# Patient Record
Sex: FEMALE | Race: WHITE | NOT HISPANIC OR LATINO | URBAN - METROPOLITAN AREA
[De-identification: names, ages, dates, MRNs, and addresses within clinical notes are randomized per-mention and may not be internally consistent; named-entity substitution may affect disease eponyms.]

---

## 2021-01-01 ENCOUNTER — EXTERNAL RECORD (OUTPATIENT)
Dept: OTHER | Age: 61
End: 2021-01-01

## 2021-07-20 ENCOUNTER — EXTERNAL RECORD (OUTPATIENT)
Dept: HEALTH INFORMATION MANAGEMENT | Facility: OTHER | Age: 61
End: 2021-07-20

## 2021-07-21 ENCOUNTER — OFFICE VISIT (OUTPATIENT)
Dept: INTERNAL MEDICINE | Age: 61
End: 2021-07-21

## 2021-07-21 ENCOUNTER — EXTERNAL RECORD (OUTPATIENT)
Dept: HEALTH INFORMATION MANAGEMENT | Facility: OTHER | Age: 61
End: 2021-07-21

## 2021-07-21 ENCOUNTER — NURSE TRIAGE (OUTPATIENT)
Dept: INTERNAL MEDICINE | Age: 61
End: 2021-07-21

## 2021-07-21 VITALS
OXYGEN SATURATION: 99 % | WEIGHT: 130.2 LBS | BODY MASS INDEX: 24.58 KG/M2 | SYSTOLIC BLOOD PRESSURE: 99 MMHG | HEART RATE: 86 BPM | HEIGHT: 61 IN | RESPIRATION RATE: 18 BRPM | TEMPERATURE: 97.6 F | DIASTOLIC BLOOD PRESSURE: 61 MMHG

## 2021-07-21 DIAGNOSIS — M79.641 RIGHT HAND PAIN: Primary | ICD-10-CM

## 2021-07-21 DIAGNOSIS — F31.64 SEVERE BIPOLAR I DISORDER, MOST RECENT EPISODE MIXED, WITH PSYCHOTIC FEATURES (CMD): ICD-10-CM

## 2021-07-21 DIAGNOSIS — R20.2 NUMBNESS AND TINGLING IN RIGHT HAND: ICD-10-CM

## 2021-07-21 DIAGNOSIS — R20.0 NUMBNESS AND TINGLING IN RIGHT HAND: ICD-10-CM

## 2021-07-21 PROBLEM — K52.9 NONSPECIFIC COLITIS: Status: ACTIVE | Noted: 2021-07-21

## 2021-07-21 PROBLEM — F31.9 BIPOLAR I DISORDER (CMD): Status: ACTIVE | Noted: 2021-07-21

## 2021-07-21 PROBLEM — M76.50 PATELLAR TENDINITIS: Status: ACTIVE | Noted: 2020-06-16

## 2021-07-21 PROBLEM — S62.304A FRACTURE OF FOURTH METACARPAL BONE OF RIGHT HAND: Status: ACTIVE | Noted: 2021-07-21

## 2021-07-21 PROCEDURE — 99203 OFFICE O/P NEW LOW 30 MIN: CPT | Performed by: FAMILY MEDICINE

## 2021-07-21 RX ORDER — MELOXICAM 7.5 MG/1
TABLET ORAL
COMMUNITY
Start: 2020-05-11 | End: 2021-07-23 | Stop reason: ALTCHOICE

## 2021-07-21 RX ORDER — GABAPENTIN 100 MG/1
100 CAPSULE ORAL 3 TIMES DAILY
Qty: 90 CAPSULE | Refills: 2 | Status: ON HOLD | OUTPATIENT
Start: 2021-07-21 | End: 2021-08-10 | Stop reason: HOSPADM

## 2021-07-21 RX ORDER — AMOXICILLIN AND CLAVULANATE POTASSIUM 875; 125 MG/1; MG/1
TABLET, FILM COATED ORAL
COMMUNITY
Start: 2017-08-08 | End: 2021-07-22 | Stop reason: ALTCHOICE

## 2021-07-21 ASSESSMENT — PATIENT HEALTH QUESTIONNAIRE - PHQ9
SUM OF ALL RESPONSES TO PHQ9 QUESTIONS 1 AND 2: 0
SUM OF ALL RESPONSES TO PHQ9 QUESTIONS 1 AND 2: 0
CLINICAL INTERPRETATION OF PHQ9 SCORE: NO FURTHER SCREENING NEEDED
2. FEELING DOWN, DEPRESSED OR HOPELESS: NOT AT ALL
CLINICAL INTERPRETATION OF PHQ2 SCORE: NO FURTHER SCREENING NEEDED
1. LITTLE INTEREST OR PLEASURE IN DOING THINGS: NOT AT ALL

## 2021-07-22 ENCOUNTER — WALK IN (OUTPATIENT)
Dept: URGENT CARE | Age: 61
End: 2021-07-22

## 2021-07-22 VITALS
TEMPERATURE: 97.7 F | DIASTOLIC BLOOD PRESSURE: 60 MMHG | RESPIRATION RATE: 18 BRPM | SYSTOLIC BLOOD PRESSURE: 98 MMHG | HEART RATE: 90 BPM | OXYGEN SATURATION: 99 %

## 2021-07-22 DIAGNOSIS — M79.641 RIGHT HAND PAIN: ICD-10-CM

## 2021-07-22 DIAGNOSIS — R20.2 NUMBNESS AND TINGLING IN RIGHT HAND: ICD-10-CM

## 2021-07-22 DIAGNOSIS — J02.9 SORE THROAT: Primary | ICD-10-CM

## 2021-07-22 DIAGNOSIS — Z20.822 SUSPECTED COVID-19 VIRUS INFECTION: ICD-10-CM

## 2021-07-22 DIAGNOSIS — R20.0 NUMBNESS AND TINGLING IN RIGHT HAND: ICD-10-CM

## 2021-07-22 LAB
INTERNAL PROCEDURAL CONTROLS ACCEPTABLE: YES
S PYO AG THROAT QL IA.RAPID: NEGATIVE
SARS-COV+SARS-COV-2 AG RESP QL IA.RAPID: NOT DETECTED

## 2021-07-22 PROCEDURE — 87880 STREP A ASSAY W/OPTIC: CPT | Performed by: INTERNAL MEDICINE

## 2021-07-22 PROCEDURE — 87426 SARSCOV CORONAVIRUS AG IA: CPT | Performed by: INTERNAL MEDICINE

## 2021-07-22 PROCEDURE — 99214 OFFICE O/P EST MOD 30 MIN: CPT | Performed by: INTERNAL MEDICINE

## 2021-07-22 RX ORDER — MELOXICAM 7.5 MG/1
7.5 TABLET ORAL DAILY
Qty: 30 TABLET | Refills: 0 | Status: SHIPPED | OUTPATIENT
Start: 2021-07-22 | End: 2021-09-22 | Stop reason: ALTCHOICE

## 2021-07-22 RX ORDER — CODEINE PHOSPHATE AND GUAIFENESIN 10; 100 MG/5ML; MG/5ML
5 SOLUTION ORAL 3 TIMES DAILY PRN
Qty: 120 ML | Refills: 0 | Status: ON HOLD | OUTPATIENT
Start: 2021-07-22 | End: 2021-08-10 | Stop reason: DRUGHIGH

## 2021-07-23 ENCOUNTER — IMAGING SERVICES (OUTPATIENT)
Dept: GENERAL RADIOLOGY | Age: 61
End: 2021-07-23
Attending: INTERNAL MEDICINE

## 2021-07-23 ENCOUNTER — OFFICE VISIT (OUTPATIENT)
Dept: SPORTS MEDICINE | Age: 61
End: 2021-07-23
Attending: FAMILY MEDICINE

## 2021-07-23 VITALS
SYSTOLIC BLOOD PRESSURE: 128 MMHG | WEIGHT: 130 LBS | BODY MASS INDEX: 24.55 KG/M2 | HEART RATE: 80 BPM | DIASTOLIC BLOOD PRESSURE: 72 MMHG | HEIGHT: 61 IN

## 2021-07-23 DIAGNOSIS — R20.0 NUMBNESS AND TINGLING IN RIGHT HAND: Primary | ICD-10-CM

## 2021-07-23 DIAGNOSIS — M19.041 OSTEOARTHRITIS OF RIGHT HAND, UNSPECIFIED OSTEOARTHRITIS TYPE: ICD-10-CM

## 2021-07-23 DIAGNOSIS — M79.641 RIGHT HAND PAIN: ICD-10-CM

## 2021-07-23 DIAGNOSIS — R20.2 NUMBNESS AND TINGLING IN RIGHT HAND: Primary | ICD-10-CM

## 2021-07-23 DIAGNOSIS — M77.8 TENDINITIS OF RIGHT WRIST: ICD-10-CM

## 2021-07-23 PROCEDURE — 99244 OFF/OP CNSLTJ NEW/EST MOD 40: CPT | Performed by: INTERNAL MEDICINE

## 2021-07-23 PROCEDURE — 73130 X-RAY EXAM OF HAND: CPT | Performed by: RADIOLOGY

## 2021-07-23 PROCEDURE — L3908 WHO COCK-UP NONMOLDE PRE OTS: HCPCS

## 2021-07-23 RX ORDER — PREDNISONE 10 MG/1
TABLET ORAL
COMMUNITY
Start: 2021-07-21 | End: 2021-07-23 | Stop reason: ALTCHOICE

## 2021-07-26 ENCOUNTER — APPOINTMENT (OUTPATIENT)
Dept: INTERNAL MEDICINE | Age: 61
End: 2021-07-26

## 2021-07-27 ENCOUNTER — APPOINTMENT (OUTPATIENT)
Dept: INTERNAL MEDICINE | Age: 61
End: 2021-07-27

## 2021-07-29 LAB
SARS-COV-2 RNA SPEC QL NAA+PROBE: NOT DETECTED
SPECIMEN SOURCE: NORMAL

## 2021-07-30 ENCOUNTER — HOSPITAL ENCOUNTER (INPATIENT)
Age: 61
LOS: 11 days | Discharge: HOME | DRG: 885 | End: 2021-08-10
Attending: PSYCHIATRY & NEUROLOGY | Admitting: PSYCHIATRY & NEUROLOGY

## 2021-07-30 LAB
CHOLEST SERPL-MCNC: 180 MG/DL
CHOLEST/HDLC SERPL: 3.8 {RATIO}
FASTING DURATION TIME PATIENT: ABNORMAL H
HBA1C MFR BLD: 6.1 % (ref 4.5–5.6)
HDLC SERPL-MCNC: 47 MG/DL
LDLC SERPL CALC-MCNC: 108 MG/DL
NONHDLC SERPL-MCNC: 133 MG/DL
TRIGL SERPL-MCNC: 125 MG/DL
TSH SERPL-ACNC: 2.44 MCUNITS/ML (ref 0.35–5)

## 2021-07-30 PROCEDURE — 80061 LIPID PANEL: CPT | Performed by: PSYCHIATRY & NEUROLOGY

## 2021-07-30 PROCEDURE — 99253 IP/OBS CNSLTJ NEW/EST LOW 45: CPT | Performed by: INTERNAL MEDICINE

## 2021-07-30 PROCEDURE — 36415 COLL VENOUS BLD VENIPUNCTURE: CPT | Performed by: PSYCHIATRY & NEUROLOGY

## 2021-07-30 PROCEDURE — 99222 1ST HOSP IP/OBS MODERATE 55: CPT | Performed by: PSYCHIATRY & NEUROLOGY

## 2021-07-30 PROCEDURE — 10004577 HB ROOM CHARGE PSYCH

## 2021-07-30 PROCEDURE — 84443 ASSAY THYROID STIM HORMONE: CPT | Performed by: PSYCHIATRY & NEUROLOGY

## 2021-07-30 PROCEDURE — 10004651 HB RX, NO CHARGE ITEM: Performed by: PSYCHIATRY & NEUROLOGY

## 2021-07-30 PROCEDURE — 83036 HEMOGLOBIN GLYCOSYLATED A1C: CPT | Performed by: PSYCHIATRY & NEUROLOGY

## 2021-07-30 RX ORDER — HYDROXYZINE HYDROCHLORIDE 25 MG/1
50 TABLET, FILM COATED ORAL EVERY 4 HOURS PRN
Status: DISCONTINUED | OUTPATIENT
Start: 2021-07-30 | End: 2021-08-10 | Stop reason: HOSPADM

## 2021-07-30 RX ORDER — MAGNESIUM HYDROXIDE/ALUMINUM HYDROXICE/SIMETHICONE 120; 1200; 1200 MG/30ML; MG/30ML; MG/30ML
30 SUSPENSION ORAL EVERY 4 HOURS PRN
Status: DISCONTINUED | OUTPATIENT
Start: 2021-07-30 | End: 2021-08-10 | Stop reason: HOSPADM

## 2021-07-30 RX ORDER — TRAZODONE HYDROCHLORIDE 50 MG/1
50 TABLET ORAL NIGHTLY PRN
Status: DISCONTINUED | OUTPATIENT
Start: 2021-07-30 | End: 2021-08-10 | Stop reason: HOSPADM

## 2021-07-30 RX ORDER — BENZTROPINE MESYLATE 1 MG/1
1 TABLET ORAL EVERY 4 HOURS PRN
Status: DISCONTINUED | OUTPATIENT
Start: 2021-07-30 | End: 2021-08-10 | Stop reason: HOSPADM

## 2021-07-30 RX ORDER — LORAZEPAM 2 MG/1
2 TABLET ORAL EVERY 4 HOURS PRN
Status: DISCONTINUED | OUTPATIENT
Start: 2021-07-30 | End: 2021-08-10 | Stop reason: HOSPADM

## 2021-07-30 RX ORDER — HALOPERIDOL 5 MG/1
5 TABLET ORAL EVERY 4 HOURS PRN
Status: DISCONTINUED | OUTPATIENT
Start: 2021-07-30 | End: 2021-08-10 | Stop reason: HOSPADM

## 2021-07-30 RX ORDER — HALOPERIDOL 5 MG/ML
5 INJECTION INTRAMUSCULAR EVERY 4 HOURS PRN
Status: DISCONTINUED | OUTPATIENT
Start: 2021-07-30 | End: 2021-08-10 | Stop reason: HOSPADM

## 2021-07-30 RX ORDER — BENZTROPINE MESYLATE 1 MG/ML
1 INJECTION INTRAMUSCULAR; INTRAVENOUS EVERY 4 HOURS PRN
Status: DISCONTINUED | OUTPATIENT
Start: 2021-07-30 | End: 2021-08-10 | Stop reason: HOSPADM

## 2021-07-30 RX ORDER — GABAPENTIN 300 MG/1
300 CAPSULE ORAL EVERY 12 HOURS SCHEDULED
Status: DISCONTINUED | OUTPATIENT
Start: 2021-07-31 | End: 2021-08-04

## 2021-07-30 RX ORDER — ACETAMINOPHEN 325 MG/1
650 TABLET ORAL EVERY 4 HOURS PRN
Status: DISCONTINUED | OUTPATIENT
Start: 2021-07-30 | End: 2021-08-10 | Stop reason: HOSPADM

## 2021-07-30 RX ORDER — LORAZEPAM 2 MG/ML
2 INJECTION INTRAMUSCULAR EVERY 4 HOURS PRN
Status: DISCONTINUED | OUTPATIENT
Start: 2021-07-30 | End: 2021-08-10 | Stop reason: HOSPADM

## 2021-07-30 RX ADMIN — ACETAMINOPHEN 650 MG: 325 TABLET, FILM COATED ORAL at 13:58

## 2021-07-30 ASSESSMENT — LIFESTYLE VARIABLES
ARE YOU BLIND OR DO YOU HAVE SERIOUS DIFFICULTY SEEING, EVEN WHEN WEARING GLASSES: NO
AUDIT-C TOTAL SCORE: 0
HOW OFTEN DO YOU HAVE 6 OR MORE DRINKS ON ONE OCCASION: NEVER
RECENTLY LOST WEIGHT WITHOUT TRYING: 0
CAFFEINE: YES
SHORT OF BREATH OR FATIGUE WITH ADLS: NO
IS PATIENT ABLE TO COMPLETE ASSESSMENT AT THIS TIME: YES
AMOUNT_OF_TOBACCO_USED: FIVE OR MORE CIGARETTES PER DAY AND/OR CIGAR OR PIPE DAILY
TYPE_OF_TOBACCO_USED: CIGARETTES
ADL NEEDS ASSIST: NO
ARE YOU DEAF OR DO YOU HAVE SERIOUS DIFFICULTY  HEARING: NO
ALCOHOL_USE_STATUS: NO OR LOW RISK WITH VALIDATED TOOL
HOW OFTEN DO YOU HAVE A DRINK CONTAINING ALCOHOL: NEVER
RECENT DECLINE IN ADLS: NO
HOW MANY STANDARD DRINKS CONTAINING ALCOHOL DO YOU HAVE ON A TYPICAL DAY: 0,1 OR 2
HAVE YOU BEEN EATING POORLY BECAUSE OF A DECREASED APPETITE: 1
ADL BEFORE ADMISSION: INDEPENDENT
TOBACCO_USE_STATUS_IN_THE_LAST_30_DAYS: USED TOBACCO IN THE LAST 30 DAYS

## 2021-07-30 ASSESSMENT — PAIN SCALES - GENERAL
PAINLEVEL_OUTOF10: 0

## 2021-07-30 ASSESSMENT — ACTIVITIES OF DAILY LIVING (ADL): ADL_SCORE: 12

## 2021-07-31 PROCEDURE — 10002803 HB RX 637: Performed by: PSYCHIATRY & NEUROLOGY

## 2021-07-31 PROCEDURE — 10004577 HB ROOM CHARGE PSYCH

## 2021-07-31 PROCEDURE — 10004651 HB RX, NO CHARGE ITEM: Performed by: PSYCHIATRY & NEUROLOGY

## 2021-07-31 RX ADMIN — ACETAMINOPHEN 650 MG: 325 TABLET, FILM COATED ORAL at 15:50

## 2021-07-31 RX ADMIN — GABAPENTIN 300 MG: 300 CAPSULE ORAL at 21:16

## 2021-07-31 RX ADMIN — GABAPENTIN 300 MG: 300 CAPSULE ORAL at 08:26

## 2021-07-31 RX ADMIN — TRAZODONE HYDROCHLORIDE 50 MG: 50 TABLET ORAL at 21:16

## 2021-07-31 ASSESSMENT — PAIN SCALES - GENERAL
PAINLEVEL_OUTOF10: 0
PAINLEVEL_OUTOF10: 3

## 2021-08-01 PROCEDURE — 10004577 HB ROOM CHARGE PSYCH

## 2021-08-01 PROCEDURE — 10002803 HB RX 637: Performed by: PSYCHIATRY & NEUROLOGY

## 2021-08-01 RX ADMIN — ALUMINUM HYDROXIDE, MAGNESIUM HYDROXIDE, AND SIMETHICONE 30 ML: 200; 200; 20 SUSPENSION ORAL at 20:19

## 2021-08-01 RX ADMIN — GABAPENTIN 300 MG: 300 CAPSULE ORAL at 07:59

## 2021-08-01 RX ADMIN — ALUMINUM HYDROXIDE, MAGNESIUM HYDROXIDE, AND SIMETHICONE 30 ML: 200; 200; 20 SUSPENSION ORAL at 13:31

## 2021-08-01 RX ADMIN — TRAZODONE HYDROCHLORIDE 50 MG: 50 TABLET ORAL at 20:55

## 2021-08-01 RX ADMIN — GABAPENTIN 300 MG: 300 CAPSULE ORAL at 20:55

## 2021-08-01 ASSESSMENT — PAIN SCALES - GENERAL
PAINLEVEL_OUTOF10: 0

## 2021-08-02 PROCEDURE — 99232 SBSQ HOSP IP/OBS MODERATE 35: CPT | Performed by: PSYCHIATRY & NEUROLOGY

## 2021-08-02 PROCEDURE — 10002803 HB RX 637: Performed by: PSYCHIATRY & NEUROLOGY

## 2021-08-02 PROCEDURE — 10002803 HB RX 637: Performed by: INTERNAL MEDICINE

## 2021-08-02 PROCEDURE — 10004577 HB ROOM CHARGE PSYCH

## 2021-08-02 PROCEDURE — 10004651 HB RX, NO CHARGE ITEM: Performed by: PSYCHIATRY & NEUROLOGY

## 2021-08-02 RX ORDER — MELOXICAM 7.5 MG/1
7.5 TABLET ORAL DAILY
Status: DISCONTINUED | OUTPATIENT
Start: 2021-08-02 | End: 2021-08-10 | Stop reason: HOSPADM

## 2021-08-02 RX ADMIN — GABAPENTIN 300 MG: 300 CAPSULE ORAL at 08:50

## 2021-08-02 RX ADMIN — ACETAMINOPHEN 650 MG: 325 TABLET, FILM COATED ORAL at 11:20

## 2021-08-02 RX ADMIN — MELOXICAM 7.5 MG: 7.5 TABLET ORAL at 12:41

## 2021-08-02 RX ADMIN — GABAPENTIN 300 MG: 300 CAPSULE ORAL at 20:20

## 2021-08-02 ASSESSMENT — PAIN SCALES - GENERAL
PAINLEVEL_OUTOF10: 0
PAINLEVEL_OUTOF10: 0
PAINLEVEL_OUTOF10: 5

## 2021-08-03 PROCEDURE — 99232 SBSQ HOSP IP/OBS MODERATE 35: CPT | Performed by: PSYCHIATRY & NEUROLOGY

## 2021-08-03 PROCEDURE — 10002803 HB RX 637: Performed by: PSYCHIATRY & NEUROLOGY

## 2021-08-03 PROCEDURE — 10004577 HB ROOM CHARGE PSYCH

## 2021-08-03 PROCEDURE — 10002803 HB RX 637: Performed by: INTERNAL MEDICINE

## 2021-08-03 RX ADMIN — GABAPENTIN 300 MG: 300 CAPSULE ORAL at 08:12

## 2021-08-03 RX ADMIN — GABAPENTIN 300 MG: 300 CAPSULE ORAL at 21:19

## 2021-08-03 RX ADMIN — MELOXICAM 7.5 MG: 7.5 TABLET ORAL at 08:12

## 2021-08-03 ASSESSMENT — PAIN SCALES - GENERAL
PAINLEVEL_OUTOF10: 0
PAINLEVEL_OUTOF10: 0

## 2021-08-04 PROCEDURE — 10002803 HB RX 637: Performed by: PSYCHIATRY & NEUROLOGY

## 2021-08-04 PROCEDURE — 10002803 HB RX 637: Performed by: INTERNAL MEDICINE

## 2021-08-04 PROCEDURE — 10004577 HB ROOM CHARGE PSYCH

## 2021-08-04 PROCEDURE — 10004651 HB RX, NO CHARGE ITEM: Performed by: PSYCHIATRY & NEUROLOGY

## 2021-08-04 PROCEDURE — 99232 SBSQ HOSP IP/OBS MODERATE 35: CPT | Performed by: PSYCHIATRY & NEUROLOGY

## 2021-08-04 RX ORDER — GABAPENTIN 100 MG/1
200 CAPSULE ORAL 2 TIMES DAILY
Status: DISCONTINUED | OUTPATIENT
Start: 2021-08-04 | End: 2021-08-10 | Stop reason: HOSPADM

## 2021-08-04 RX ADMIN — ALUMINUM HYDROXIDE, MAGNESIUM HYDROXIDE, AND SIMETHICONE 30 ML: 200; 200; 20 SUSPENSION ORAL at 16:11

## 2021-08-04 RX ADMIN — TRAZODONE HYDROCHLORIDE 50 MG: 50 TABLET ORAL at 21:16

## 2021-08-04 RX ADMIN — GABAPENTIN 200 MG: 100 CAPSULE ORAL at 20:48

## 2021-08-04 RX ADMIN — ACETAMINOPHEN 650 MG: 325 TABLET, FILM COATED ORAL at 21:16

## 2021-08-04 RX ADMIN — GABAPENTIN 300 MG: 300 CAPSULE ORAL at 08:46

## 2021-08-04 RX ADMIN — MELOXICAM 7.5 MG: 7.5 TABLET ORAL at 08:46

## 2021-08-04 ASSESSMENT — PAIN SCALES - GENERAL
PAINLEVEL_OUTOF10: 0
PAINLEVEL_OUTOF10: 0
PAINLEVEL_OUTOF10: 7

## 2021-08-05 PROCEDURE — 10004651 HB RX, NO CHARGE ITEM: Performed by: PSYCHIATRY & NEUROLOGY

## 2021-08-05 PROCEDURE — 10004577 HB ROOM CHARGE PSYCH

## 2021-08-05 PROCEDURE — 10002803 HB RX 637: Performed by: INTERNAL MEDICINE

## 2021-08-05 PROCEDURE — 10002803 HB RX 637: Performed by: PSYCHIATRY & NEUROLOGY

## 2021-08-05 RX ADMIN — GABAPENTIN 200 MG: 100 CAPSULE ORAL at 20:47

## 2021-08-05 RX ADMIN — MELOXICAM 7.5 MG: 7.5 TABLET ORAL at 08:23

## 2021-08-05 RX ADMIN — GABAPENTIN 200 MG: 100 CAPSULE ORAL at 12:44

## 2021-08-05 RX ADMIN — ACETAMINOPHEN 650 MG: 325 TABLET, FILM COATED ORAL at 06:48

## 2021-08-05 RX ADMIN — TRAZODONE HYDROCHLORIDE 50 MG: 50 TABLET ORAL at 20:48

## 2021-08-05 ASSESSMENT — PAIN SCALES - GENERAL
PAINLEVEL_OUTOF10: 0
PAINLEVEL_OUTOF10: 0
PAINLEVEL_OUTOF10: 5
PAINLEVEL_OUTOF10: 0

## 2021-08-06 PROCEDURE — 10002803 HB RX 637: Performed by: INTERNAL MEDICINE

## 2021-08-06 PROCEDURE — 10004577 HB ROOM CHARGE PSYCH

## 2021-08-06 PROCEDURE — 10002803 HB RX 637: Performed by: PSYCHIATRY & NEUROLOGY

## 2021-08-06 PROCEDURE — 10004651 HB RX, NO CHARGE ITEM: Performed by: PSYCHIATRY & NEUROLOGY

## 2021-08-06 PROCEDURE — 99233 SBSQ HOSP IP/OBS HIGH 50: CPT | Performed by: PSYCHIATRY & NEUROLOGY

## 2021-08-06 RX ORDER — LANOLIN ALCOHOL/MO/W.PET/CERES
3 CREAM (GRAM) TOPICAL NIGHTLY
Status: DISCONTINUED | OUTPATIENT
Start: 2021-08-06 | End: 2021-08-10 | Stop reason: HOSPADM

## 2021-08-06 RX ADMIN — MELATONIN TAB 3 MG 3 MG: 3 TAB at 20:45

## 2021-08-06 RX ADMIN — ACETAMINOPHEN 650 MG: 325 TABLET, FILM COATED ORAL at 22:30

## 2021-08-06 RX ADMIN — MELOXICAM 7.5 MG: 7.5 TABLET ORAL at 08:22

## 2021-08-06 RX ADMIN — ACETAMINOPHEN 650 MG: 325 TABLET, FILM COATED ORAL at 00:23

## 2021-08-06 RX ADMIN — GABAPENTIN 200 MG: 100 CAPSULE ORAL at 13:36

## 2021-08-06 RX ADMIN — GABAPENTIN 200 MG: 100 CAPSULE ORAL at 20:45

## 2021-08-06 ASSESSMENT — PAIN SCALES - WONG BAKER: WONGBAKER_NUMERICALRESPONSE: 9

## 2021-08-06 ASSESSMENT — PAIN SCALES - GENERAL
PAINLEVEL_OUTOF10: 0
PAINLEVEL_OUTOF10: 0
PAINLEVEL_OUTOF10: 10
PAINLEVEL_OUTOF10: 10

## 2021-08-07 PROCEDURE — 10002803 HB RX 637: Performed by: INTERNAL MEDICINE

## 2021-08-07 PROCEDURE — 10004577 HB ROOM CHARGE PSYCH

## 2021-08-07 PROCEDURE — 10004651 HB RX, NO CHARGE ITEM: Performed by: PSYCHIATRY & NEUROLOGY

## 2021-08-07 PROCEDURE — 10002803 HB RX 637: Performed by: PSYCHIATRY & NEUROLOGY

## 2021-08-07 RX ADMIN — MELATONIN TAB 3 MG 3 MG: 3 TAB at 21:26

## 2021-08-07 RX ADMIN — GABAPENTIN 200 MG: 100 CAPSULE ORAL at 13:20

## 2021-08-07 RX ADMIN — ACETAMINOPHEN 650 MG: 325 TABLET, FILM COATED ORAL at 14:10

## 2021-08-07 RX ADMIN — ACETAMINOPHEN 650 MG: 325 TABLET, FILM COATED ORAL at 21:35

## 2021-08-07 RX ADMIN — MELOXICAM 7.5 MG: 7.5 TABLET ORAL at 08:06

## 2021-08-07 RX ADMIN — GABAPENTIN 200 MG: 100 CAPSULE ORAL at 21:26

## 2021-08-07 ASSESSMENT — PAIN SCALES - GENERAL
PAINLEVEL_OUTOF10: 7
PAINLEVEL_OUTOF10: 0
PAINLEVEL_OUTOF10: 0

## 2021-08-08 PROCEDURE — 10002803 HB RX 637: Performed by: PSYCHIATRY & NEUROLOGY

## 2021-08-08 PROCEDURE — 10002803 HB RX 637: Performed by: INTERNAL MEDICINE

## 2021-08-08 PROCEDURE — 10004577 HB ROOM CHARGE PSYCH

## 2021-08-08 PROCEDURE — 10004651 HB RX, NO CHARGE ITEM: Performed by: PSYCHIATRY & NEUROLOGY

## 2021-08-08 RX ADMIN — GABAPENTIN 200 MG: 100 CAPSULE ORAL at 12:32

## 2021-08-08 RX ADMIN — MELOXICAM 7.5 MG: 7.5 TABLET ORAL at 08:11

## 2021-08-08 RX ADMIN — MELATONIN TAB 3 MG 3 MG: 3 TAB at 20:41

## 2021-08-08 RX ADMIN — GABAPENTIN 200 MG: 100 CAPSULE ORAL at 20:41

## 2021-08-08 RX ADMIN — ACETAMINOPHEN 650 MG: 325 TABLET, FILM COATED ORAL at 12:32

## 2021-08-08 RX ADMIN — ACETAMINOPHEN 650 MG: 325 TABLET, FILM COATED ORAL at 02:40

## 2021-08-08 RX ADMIN — ACETAMINOPHEN 650 MG: 325 TABLET, FILM COATED ORAL at 20:47

## 2021-08-08 ASSESSMENT — PAIN SCALES - GENERAL
PAINLEVEL_OUTOF10: 9
PAINLEVEL_OUTOF10: 5
PAINLEVEL_OUTOF10: 0
PAINLEVEL_OUTOF10: 8

## 2021-08-09 PROCEDURE — 10002803 HB RX 637: Performed by: PSYCHIATRY & NEUROLOGY

## 2021-08-09 PROCEDURE — 10002803 HB RX 637: Performed by: INTERNAL MEDICINE

## 2021-08-09 PROCEDURE — 10004651 HB RX, NO CHARGE ITEM: Performed by: PSYCHIATRY & NEUROLOGY

## 2021-08-09 PROCEDURE — 99232 SBSQ HOSP IP/OBS MODERATE 35: CPT | Performed by: PSYCHIATRY & NEUROLOGY

## 2021-08-09 PROCEDURE — 10004577 HB ROOM CHARGE PSYCH

## 2021-08-09 RX ADMIN — ALUMINUM HYDROXIDE, MAGNESIUM HYDROXIDE, AND SIMETHICONE 30 ML: 200; 200; 20 SUSPENSION ORAL at 10:45

## 2021-08-09 RX ADMIN — GABAPENTIN 200 MG: 100 CAPSULE ORAL at 13:50

## 2021-08-09 RX ADMIN — ACETAMINOPHEN 650 MG: 325 TABLET, FILM COATED ORAL at 16:01

## 2021-08-09 RX ADMIN — GABAPENTIN 200 MG: 100 CAPSULE ORAL at 21:22

## 2021-08-09 RX ADMIN — MELOXICAM 7.5 MG: 7.5 TABLET ORAL at 08:08

## 2021-08-09 RX ADMIN — ACETAMINOPHEN 650 MG: 325 TABLET, FILM COATED ORAL at 03:17

## 2021-08-09 RX ADMIN — ALUMINUM HYDROXIDE, MAGNESIUM HYDROXIDE, AND SIMETHICONE 30 ML: 200; 200; 20 SUSPENSION ORAL at 19:59

## 2021-08-09 RX ADMIN — ACETAMINOPHEN 650 MG: 325 TABLET, FILM COATED ORAL at 08:08

## 2021-08-09 ASSESSMENT — PAIN SCALES - GENERAL
PAINLEVEL_OUTOF10: 5
PAINLEVEL_OUTOF10: 5
PAINLEVEL_OUTOF10: 7

## 2021-08-10 VITALS
HEART RATE: 80 BPM | OXYGEN SATURATION: 98 % | BODY MASS INDEX: 23.93 KG/M2 | HEIGHT: 61 IN | TEMPERATURE: 97.3 F | DIASTOLIC BLOOD PRESSURE: 87 MMHG | SYSTOLIC BLOOD PRESSURE: 144 MMHG | RESPIRATION RATE: 18 BRPM | WEIGHT: 126.76 LBS

## 2021-08-10 PROCEDURE — 10002803 HB RX 637: Performed by: INTERNAL MEDICINE

## 2021-08-10 PROCEDURE — 99238 HOSP IP/OBS DSCHRG MGMT 30/<: CPT | Performed by: PSYCHIATRY & NEUROLOGY

## 2021-08-10 PROCEDURE — 10004651 HB RX, NO CHARGE ITEM: Performed by: PSYCHIATRY & NEUROLOGY

## 2021-08-10 RX ORDER — LANOLIN ALCOHOL/MO/W.PET/CERES
3 CREAM (GRAM) TOPICAL NIGHTLY
Qty: 30 TABLET | Refills: 0 | Status: SHIPPED | OUTPATIENT
Start: 2021-08-10 | End: 2021-09-22 | Stop reason: ALTCHOICE

## 2021-08-10 RX ORDER — GABAPENTIN 100 MG/1
200 CAPSULE ORAL 2 TIMES DAILY
Qty: 120 CAPSULE | Refills: 0 | Status: SHIPPED | OUTPATIENT
Start: 2021-08-10 | End: 2021-11-02 | Stop reason: SDUPTHER

## 2021-08-10 RX ADMIN — ACETAMINOPHEN 650 MG: 325 TABLET, FILM COATED ORAL at 06:00

## 2021-08-10 RX ADMIN — MELOXICAM 7.5 MG: 7.5 TABLET ORAL at 08:20

## 2021-08-10 ASSESSMENT — PAIN SCALES - GENERAL
PAINLEVEL_OUTOF10: 5
PAINLEVEL_OUTOF10: 0

## 2021-08-11 ENCOUNTER — TELEPHONE (OUTPATIENT)
Dept: BEHAVIORAL HEALTH | Age: 61
End: 2021-08-11

## 2021-08-11 ENCOUNTER — APPOINTMENT (OUTPATIENT)
Dept: BEHAVIORAL HEALTH | Age: 61
End: 2021-08-11
Attending: PSYCHIATRY & NEUROLOGY

## 2021-08-25 ENCOUNTER — CASE MANAGEMENT (OUTPATIENT)
Dept: CARE COORDINATION | Age: 61
End: 2021-08-25

## 2021-09-03 ENCOUNTER — TELEPHONIC VISIT (OUTPATIENT)
Dept: BEHAVIORAL HEALTH | Age: 61
End: 2021-09-03
Attending: PSYCHIATRY & NEUROLOGY

## 2021-09-03 DIAGNOSIS — F31.64 SEVERE BIPOLAR I DISORDER, MOST RECENT EPISODE MIXED, WITH PSYCHOTIC FEATURES (CMD): Primary | ICD-10-CM

## 2021-09-03 PROCEDURE — 90791 PSYCH DIAGNOSTIC EVALUATION: CPT | Performed by: SOCIAL WORKER

## 2021-09-03 ASSESSMENT — PATIENT HEALTH QUESTIONNAIRE - PHQ9
SUM OF ALL RESPONSES TO PHQ9 QUESTIONS 1 AND 2: 2
1. LITTLE INTEREST OR PLEASURE IN DOING THINGS: SEVERAL DAYS
SUM OF ALL RESPONSES TO PHQ9 QUESTIONS 1 AND 2: 2
CLINICAL INTERPRETATION OF PHQ2 SCORE: NO FURTHER SCREENING NEEDED
2. FEELING DOWN, DEPRESSED OR HOPELESS: SEVERAL DAYS
CLINICAL INTERPRETATION OF PHQ9 SCORE: NO FURTHER SCREENING NEEDED

## 2021-09-03 ASSESSMENT — ANXIETY QUESTIONNAIRES
2. NOT BEING ABLE TO STOP OR CONTROL WORRYING: 1
1. FEELING NERVOUS, ANXIOUS, OR ON EDGE: 1
3. WORRYING TOO MUCH ABOUT DIFFERENT THINGS: 1
5. BEING SO RESTLESS THAT IT IS HARD TO SIT STILL: 0
1. FEELING NERVOUS, ANXIOUS, OR ON EDGE: SEVERAL DAYS
5. BEING SO RESTLESS THAT IT IS HARD TO SIT STILL: NOT AT ALL
IF YOU CHECKED OFF ANY PROBLEMS ON THIS QUESTIONNAIRE, HOW DIFFICULT HAVE THESE PROBLEMS MADE IT FOR YOU TO DO YOUR WORK, TAKE CARE OF THINGS AT HOME, OR GET ALONG WITH OTHER PEOPLE: SOMEWHAT DIFFICULT
6. BECOMING EASILY ANNOYED OR IRRITABLE: 1
4. TROUBLE RELAXING: 1
6. BECOMING EASILY ANNOYED OR IRRITABLE: SEVERAL DAYS
2. NOT BEING ABLE TO STOP OR CONTROL WORRYING: SEVERAL DAYS
7. FEELING AFRAID AS IF SOMETHING AWFUL MIGHT HAPPEN: 1
3. WORRYING TOO MUCH ABOUT DIFFERENT THINGS: SEVERAL DAYS
GAD7 TOTAL SCORE: 6
4. TROUBLE RELAXING: SEVERAL DAYS
7. FEELING AFRAID AS IF SOMETHING AWFUL MIGHT HAPPEN: SEVERAL DAYS

## 2021-09-07 ENCOUNTER — OFFICE VISIT (OUTPATIENT)
Dept: INTERNAL MEDICINE | Age: 61
End: 2021-09-07

## 2021-09-07 VITALS
HEIGHT: 62 IN | SYSTOLIC BLOOD PRESSURE: 103 MMHG | OXYGEN SATURATION: 97 % | WEIGHT: 133.8 LBS | BODY MASS INDEX: 24.62 KG/M2 | HEART RATE: 84 BPM | TEMPERATURE: 97.6 F | DIASTOLIC BLOOD PRESSURE: 68 MMHG | RESPIRATION RATE: 18 BRPM

## 2021-09-07 DIAGNOSIS — R05.9 COUGH: ICD-10-CM

## 2021-09-07 DIAGNOSIS — F31.64 SEVERE BIPOLAR I DISORDER, MOST RECENT EPISODE MIXED, WITH PSYCHOTIC FEATURES (CMD): ICD-10-CM

## 2021-09-07 DIAGNOSIS — J30.2 SEASONAL ALLERGIES: ICD-10-CM

## 2021-09-07 DIAGNOSIS — J06.9 ACUTE URI: Primary | ICD-10-CM

## 2021-09-07 PROCEDURE — 99214 OFFICE O/P EST MOD 30 MIN: CPT | Performed by: FAMILY MEDICINE

## 2021-09-07 RX ORDER — AZITHROMYCIN 250 MG/1
TABLET, FILM COATED ORAL
Qty: 6 TABLET | Refills: 0 | Status: SHIPPED | OUTPATIENT
Start: 2021-09-07 | End: 2021-09-22 | Stop reason: ALTCHOICE

## 2021-09-07 RX ORDER — DIVALPROEX SODIUM 500 MG/1
500 TABLET, DELAYED RELEASE ORAL 2 TIMES DAILY WITH MEALS
COMMUNITY
Start: 2021-09-03 | End: 2021-09-23 | Stop reason: SDUPTHER

## 2021-09-07 RX ORDER — BENZONATATE 100 MG/1
100 CAPSULE ORAL 3 TIMES DAILY PRN
Qty: 20 CAPSULE | Refills: 0 | Status: SHIPPED | OUTPATIENT
Start: 2021-09-07 | End: 2021-09-22 | Stop reason: ALTCHOICE

## 2021-09-07 RX ORDER — RISPERIDONE 2 MG/1
2 TABLET ORAL 2 TIMES DAILY
COMMUNITY
Start: 2021-09-03 | End: 2021-09-23 | Stop reason: SDUPTHER

## 2021-09-07 RX ORDER — FEXOFENADINE HCL 180 MG/1
180 TABLET ORAL DAILY
Qty: 30 TABLET | Refills: 5 | Status: SHIPPED | OUTPATIENT
Start: 2021-09-07

## 2021-09-10 ENCOUNTER — TELEPHONE (OUTPATIENT)
Dept: INTERNAL MEDICINE | Age: 61
End: 2021-09-10

## 2021-09-10 ENCOUNTER — IMAGING SERVICES (OUTPATIENT)
Dept: GENERAL RADIOLOGY | Age: 61
End: 2021-09-10
Attending: FAMILY MEDICINE

## 2021-09-10 DIAGNOSIS — J06.9 ACUTE URI: ICD-10-CM

## 2021-09-10 DIAGNOSIS — R05.9 COUGH: Primary | ICD-10-CM

## 2021-09-10 PROCEDURE — 71046 X-RAY EXAM CHEST 2 VIEWS: CPT | Performed by: RADIOLOGY

## 2021-09-18 ENCOUNTER — APPOINTMENT (OUTPATIENT)
Dept: URGENT CARE | Age: 61
End: 2021-09-18

## 2021-09-20 ENCOUNTER — TELEPHONE (OUTPATIENT)
Dept: SPORTS MEDICINE | Age: 61
End: 2021-09-20

## 2021-09-21 ENCOUNTER — TELEPHONE (OUTPATIENT)
Dept: PULMONOLOGY | Age: 61
End: 2021-09-21

## 2021-09-22 ENCOUNTER — OFFICE VISIT (OUTPATIENT)
Dept: SPORTS MEDICINE | Age: 61
End: 2021-09-22

## 2021-09-22 ENCOUNTER — IMAGING SERVICES (OUTPATIENT)
Dept: GENERAL RADIOLOGY | Age: 61
End: 2021-09-22
Attending: INTERNAL MEDICINE

## 2021-09-22 VITALS
SYSTOLIC BLOOD PRESSURE: 110 MMHG | DIASTOLIC BLOOD PRESSURE: 60 MMHG | HEIGHT: 62 IN | HEART RATE: 80 BPM | BODY MASS INDEX: 25.03 KG/M2 | WEIGHT: 136 LBS

## 2021-09-22 DIAGNOSIS — M79.641 RIGHT HAND PAIN: ICD-10-CM

## 2021-09-22 DIAGNOSIS — M25.641 STIFFNESS OF HAND JOINT, RIGHT: ICD-10-CM

## 2021-09-22 DIAGNOSIS — S62.336A CLOSED DISPLACED FRACTURE OF NECK OF FIFTH METACARPAL BONE OF RIGHT HAND, INITIAL ENCOUNTER: Primary | ICD-10-CM

## 2021-09-22 PROCEDURE — 26600 TREAT METACARPAL FRACTURE: CPT | Performed by: INTERNAL MEDICINE

## 2021-09-22 PROCEDURE — 99214 OFFICE O/P EST MOD 30 MIN: CPT | Performed by: INTERNAL MEDICINE

## 2021-09-22 PROCEDURE — 73130 X-RAY EXAM OF HAND: CPT | Performed by: RADIOLOGY

## 2021-09-23 ENCOUNTER — V-VISIT (OUTPATIENT)
Dept: BEHAVIORAL HEALTH | Age: 61
End: 2021-09-23
Attending: PSYCHIATRY & NEUROLOGY

## 2021-09-23 DIAGNOSIS — F31.9 BIPOLAR I DISORDER (CMD): Primary | ICD-10-CM

## 2021-09-23 DIAGNOSIS — F31.64 SEVERE BIPOLAR I DISORDER, MOST RECENT EPISODE MIXED, WITH PSYCHOTIC FEATURES (CMD): ICD-10-CM

## 2021-09-23 PROCEDURE — G0463 HOSPITAL OUTPT CLINIC VISIT: HCPCS | Performed by: PSYCHIATRY & NEUROLOGY

## 2021-09-23 PROCEDURE — 99214 OFFICE O/P EST MOD 30 MIN: CPT | Performed by: PSYCHIATRY & NEUROLOGY

## 2021-09-23 RX ORDER — RISPERIDONE 2 MG/1
2 TABLET ORAL 2 TIMES DAILY
Qty: 60 TABLET | Refills: 1 | Status: SHIPPED | OUTPATIENT
Start: 2021-09-23 | End: 2021-11-02 | Stop reason: SDUPTHER

## 2021-09-23 RX ORDER — DIVALPROEX SODIUM 500 MG/1
500 TABLET, DELAYED RELEASE ORAL 2 TIMES DAILY
Qty: 60 TABLET | Refills: 1 | Status: SHIPPED | OUTPATIENT
Start: 2021-09-23 | End: 2021-11-02 | Stop reason: SDUPTHER

## 2021-09-26 DIAGNOSIS — F31.64 SEVERE BIPOLAR I DISORDER, MOST RECENT EPISODE MIXED, WITH PSYCHOTIC FEATURES (CMD): ICD-10-CM

## 2021-09-29 ENCOUNTER — TELEPHONIC VISIT (OUTPATIENT)
Dept: BEHAVIORAL HEALTH | Age: 61
End: 2021-09-29
Attending: PSYCHIATRY & NEUROLOGY

## 2021-09-29 DIAGNOSIS — F31.9 BIPOLAR I DISORDER (CMD): Primary | ICD-10-CM

## 2021-09-29 PROBLEM — M25.531 RIGHT WRIST PAIN: Status: ACTIVE | Noted: 2021-09-29

## 2021-09-29 PROBLEM — M79.641 RIGHT HAND PAIN: Status: ACTIVE | Noted: 2021-09-29

## 2021-09-29 PROCEDURE — 90832 PSYTX W PT 30 MINUTES: CPT

## 2021-09-29 RX ORDER — DIVALPROEX SODIUM 500 MG/1
TABLET, DELAYED RELEASE ORAL
Qty: 180 TABLET | OUTPATIENT
Start: 2021-09-29

## 2021-09-29 ASSESSMENT — ENCOUNTER SYMPTOMS
PAIN LOCATION: RIGHT SMALL FINGER
SUBJECTIVE PAIN PROGRESSION: IMPROVED
ALLEVIATING FACTORS: REST

## 2021-09-30 ENCOUNTER — OFFICE VISIT (OUTPATIENT)
Dept: OCCUPATIONAL THERAPY | Age: 61
End: 2021-09-30
Attending: INTERNAL MEDICINE

## 2021-09-30 DIAGNOSIS — S62.336A CLOSED DISPLACED FRACTURE OF NECK OF FIFTH METACARPAL BONE OF RIGHT HAND, INITIAL ENCOUNTER: ICD-10-CM

## 2021-09-30 DIAGNOSIS — M25.641 STIFFNESS OF HAND JOINT, RIGHT: ICD-10-CM

## 2021-09-30 PROCEDURE — 97140 MANUAL THERAPY 1/> REGIONS: CPT | Performed by: OCCUPATIONAL THERAPIST

## 2021-09-30 PROCEDURE — 97165 OT EVAL LOW COMPLEX 30 MIN: CPT | Performed by: OCCUPATIONAL THERAPIST

## 2021-09-30 PROCEDURE — 97110 THERAPEUTIC EXERCISES: CPT | Performed by: OCCUPATIONAL THERAPIST

## 2021-09-30 ASSESSMENT — ENCOUNTER SYMPTOMS
PAIN SEVERITY NOW: 5
QUALITY: ACHE
QUALITY: STIFF
PAIN FREQUENCY: INTERMITTENT
PAIN SCALE AT HIGHEST: 5

## 2021-10-05 ENCOUNTER — TELEPHONE (OUTPATIENT)
Dept: OCCUPATIONAL THERAPY | Age: 61
End: 2021-10-05

## 2021-11-02 ENCOUNTER — BEHAVIORAL HEALTH (OUTPATIENT)
Dept: BEHAVIORAL HEALTH | Age: 61
End: 2021-11-02
Attending: PSYCHIATRY & NEUROLOGY

## 2021-11-02 DIAGNOSIS — F31.9 BIPOLAR I DISORDER (CMD): Primary | ICD-10-CM

## 2021-11-02 DIAGNOSIS — F31.64 SEVERE BIPOLAR I DISORDER, MOST RECENT EPISODE MIXED, WITH PSYCHOTIC FEATURES (CMD): ICD-10-CM

## 2021-11-02 PROCEDURE — 99213 OFFICE O/P EST LOW 20 MIN: CPT | Performed by: PSYCHIATRY & NEUROLOGY

## 2021-11-02 RX ORDER — GABAPENTIN 100 MG/1
200 CAPSULE ORAL 2 TIMES DAILY
Qty: 120 CAPSULE | Refills: 1 | Status: SHIPPED | OUTPATIENT
Start: 2021-11-02 | End: 2021-12-30 | Stop reason: DRUGHIGH

## 2021-11-02 RX ORDER — DIVALPROEX SODIUM 500 MG/1
500 TABLET, DELAYED RELEASE ORAL 2 TIMES DAILY
Qty: 60 TABLET | Refills: 1 | Status: SHIPPED | OUTPATIENT
Start: 2021-11-02 | End: 2021-12-30 | Stop reason: SDUPTHER

## 2021-11-02 RX ORDER — RISPERIDONE 2 MG/1
2 TABLET ORAL 2 TIMES DAILY
Qty: 60 TABLET | Refills: 1 | Status: SHIPPED | OUTPATIENT
Start: 2021-11-02 | End: 2021-12-30 | Stop reason: SDUPTHER

## 2021-11-10 ENCOUNTER — BEHAVIORAL HEALTH (OUTPATIENT)
Dept: BEHAVIORAL HEALTH | Age: 61
End: 2021-11-10
Attending: PSYCHIATRY & NEUROLOGY

## 2021-11-10 DIAGNOSIS — F31.9 BIPOLAR I DISORDER (CMD): Primary | ICD-10-CM

## 2021-11-10 PROCEDURE — 90832 PSYTX W PT 30 MINUTES: CPT

## 2021-12-08 ENCOUNTER — BEHAVIORAL HEALTH (OUTPATIENT)
Dept: BEHAVIORAL HEALTH | Age: 61
End: 2021-12-08
Attending: PSYCHIATRY & NEUROLOGY

## 2021-12-08 DIAGNOSIS — F31.9 BIPOLAR I DISORDER (CMD): Primary | ICD-10-CM

## 2021-12-08 PROCEDURE — 90832 PSYTX W PT 30 MINUTES: CPT

## 2021-12-20 ENCOUNTER — BEHAVIORAL HEALTH (OUTPATIENT)
Dept: BEHAVIORAL HEALTH | Age: 61
End: 2021-12-20
Attending: PSYCHIATRY & NEUROLOGY

## 2021-12-20 DIAGNOSIS — F31.9 BIPOLAR I DISORDER (CMD): Primary | ICD-10-CM

## 2021-12-20 PROCEDURE — 90832 PSYTX W PT 30 MINUTES: CPT

## 2021-12-30 ENCOUNTER — BEHAVIORAL HEALTH (OUTPATIENT)
Dept: BEHAVIORAL HEALTH | Age: 61
End: 2021-12-30
Attending: PSYCHIATRY & NEUROLOGY

## 2021-12-30 DIAGNOSIS — F31.64 SEVERE BIPOLAR I DISORDER, MOST RECENT EPISODE MIXED, WITH PSYCHOTIC FEATURES (CMD): Primary | ICD-10-CM

## 2021-12-30 PROCEDURE — 99213 OFFICE O/P EST LOW 20 MIN: CPT | Performed by: PSYCHIATRY & NEUROLOGY

## 2021-12-30 RX ORDER — GABAPENTIN 100 MG/1
200 CAPSULE ORAL 2 TIMES DAILY
Qty: 180 CAPSULE | Refills: 0 | Status: SHIPPED | OUTPATIENT
Start: 2021-12-30 | End: 2022-01-29

## 2021-12-30 RX ORDER — DIVALPROEX SODIUM 500 MG/1
500 TABLET, DELAYED RELEASE ORAL 2 TIMES DAILY
Qty: 180 TABLET | Refills: 0 | Status: SHIPPED | OUTPATIENT
Start: 2021-12-30

## 2021-12-30 RX ORDER — RISPERIDONE 2 MG/1
2 TABLET ORAL 2 TIMES DAILY
Qty: 180 TABLET | Refills: 0 | Status: SHIPPED | OUTPATIENT
Start: 2021-12-30

## 2022-02-11 ENCOUNTER — DOCUMENTATION (OUTPATIENT)
Dept: BEHAVIORAL HEALTH | Age: 62
End: 2022-02-11

## 2022-02-11 DIAGNOSIS — F31.64 SEVERE BIPOLAR I DISORDER, MOST RECENT EPISODE MIXED, WITH PSYCHOTIC FEATURES (CMD): Primary | ICD-10-CM

## 2022-05-06 ENCOUNTER — TELEPHONE (OUTPATIENT)
Dept: INTERNAL MEDICINE | Age: 62
End: 2022-05-06

## 2023-07-05 ENCOUNTER — EXTERNAL RECORD (OUTPATIENT)
Dept: OTHER | Age: 63
End: 2023-07-05

## 2023-07-05 PROCEDURE — 99223 1ST HOSP IP/OBS HIGH 75: CPT | Performed by: HOSPITALIST

## 2023-07-06 LAB
*MEAN CORPUSCULAR HGB CONC: 34.2 G/DL (ref 32.5–35.8)
A/G RATIO: 1.95 (ref 1.1–2.4)
ALANINE AMINOTRANSFE: 11 U/L (ref 7–52)
ALBUMIN, SERUM (ALB): 4.1 G/DL (ref 3.5–5.7)
ALKALINE PHOSPHATASE (ALK): 44 U/L (ref 34–104)
ANION GAP: 5 MEQ/L (ref 6.2–14.7)
ASPARTATE AMINOTRANS: 13 U/L (ref 13–39)
BASOPHIL AUTOMATED: 0.5 %
BASOPHILS: 0 (ref 0–0.14)
BILIRUBIN, TOTAL: 0.5 MG/DL (ref 0.2–0.8)
BLOOD UREA NITROGEN (BUN): 15 MG/DL (ref 7–25)
BUN/CREATININE RATIO: 20.8 (ref 7.4–23)
CALCIUM, SERUM: 9 MG/DL (ref 8.6–10.3)
CARBON DIOXIDE: 29 MEQ/L (ref 21–31)
CHLORIDE, SERUM: 107 MMOL/L (ref 98–107)
CREATINE KINASE: < 10 U/L (ref 30–223)
CREATININE: 0.72 MG/DL (ref 0.6–1.2)
EOSINOPHIL AUTOMATED: 1.7 %
EOSINOPHILS: 0.1 (ref 0–0.6)
EST GLOMERULAR FILTRATION RATE: > 60 ML/MIN
GLUCOSE: 110 MG/DL (ref 70–99)
HEMATOCRIT: 36.1 % (ref 34.7–45.1)
HEMOGLOBIN: 12.3 GM/DL (ref 12–15.3)
K (POTASSIUM, SERUM): 3.9 MMOL/L (ref 3.5–5.2)
LYMPHOCYTE AUTOMATED: 37.8 %
LYMPHOCYTES: 2.7 (ref 0.78–3.73)
MEAN CORPUSCULAR HGB: 29.9 PG (ref 26–34)
MEAN CORPUSCULAR VOL: 87.5 FL (ref 80–100)
MEAN PLATELET VOLUME: 8 FL (ref 6.8–10.2)
MONOCYTE AUTOMATED: 7.4 %
MONOCYTES: 0.5 (ref 0.17–1)
NA (SODIUM, SERUM): 141 MMOL/L (ref 133–144)
NEUTROPHIL ABSOLUTE: 3.8 (ref 1.91–7.6)
NEUTROPHIL AUTOMATED: 52.6 %
PLATELET COUNT: 309 K/MM3 (ref 150–450)
PROTEIN TOTAL: 6.2 G/DL (ref 6.4–8.9)
RED BLOOD CELL COUNT: 4.13 M/MM3 (ref 3.63–5.04)
RED CELL DISTRIBUTIO: 14.7 % (ref 11.9–15.9)
WHITE BLOOD CELL COU: 7.2 K/MM3 (ref 4–11)

## 2023-07-06 PROCEDURE — 99233 SBSQ HOSP IP/OBS HIGH 50: CPT | Performed by: HOSPITALIST

## 2023-07-07 PROCEDURE — 99233 SBSQ HOSP IP/OBS HIGH 50: CPT | Performed by: HOSPITALIST

## 2023-07-08 PROCEDURE — 99239 HOSP IP/OBS DSCHRG MGMT >30: CPT | Performed by: HOSPITALIST

## 2024-01-04 PROBLEM — F31.9 BIPOLAR 1 DISORDER (HCC): Status: ACTIVE | Noted: 2024-01-04

## 2024-01-05 ENCOUNTER — APPOINTMENT (OUTPATIENT)
Dept: GENERAL RADIOLOGY | Facility: HOSPITAL | Age: 64
End: 2024-01-05
Attending: EMERGENCY MEDICINE
Payer: COMMERCIAL

## 2024-01-05 ENCOUNTER — HOSPITAL ENCOUNTER (EMERGENCY)
Facility: HOSPITAL | Age: 64
Discharge: HOME | End: 2024-01-06
Attending: EMERGENCY MEDICINE
Payer: COMMERCIAL

## 2024-01-05 DIAGNOSIS — M54.16 LUMBAR RADICULOPATHY: Primary | ICD-10-CM

## 2024-01-05 PROBLEM — F31.9 AFFECTIVE PSYCHOSIS, BIPOLAR (HCC): Status: ACTIVE | Noted: 2024-01-04

## 2024-01-05 PROBLEM — F31.64 SEVERE MIXED BIPOLAR I DISORDER WITH PSYCHOTIC FEATURES (HCC): Chronic | Status: ACTIVE | Noted: 2024-01-04

## 2024-01-05 PROBLEM — F41.1 GENERALIZED ANXIETY DISORDER: Status: ACTIVE | Noted: 2024-01-05

## 2024-01-05 PROCEDURE — 71045 X-RAY EXAM CHEST 1 VIEW: CPT | Performed by: EMERGENCY MEDICINE

## 2024-01-05 PROCEDURE — 99283 EMERGENCY DEPT VISIT LOW MDM: CPT

## 2024-01-05 PROCEDURE — 72072 X-RAY EXAM THORAC SPINE 3VWS: CPT | Performed by: EMERGENCY MEDICINE

## 2024-01-05 PROCEDURE — 99284 EMERGENCY DEPT VISIT MOD MDM: CPT

## 2024-01-05 RX ORDER — HYDROCODONE BITARTRATE AND ACETAMINOPHEN 5; 325 MG/1; MG/1
2 TABLET ORAL ONCE
Status: COMPLETED | OUTPATIENT
Start: 2024-01-05 | End: 2024-01-05

## 2024-01-05 RX ORDER — PREDNISONE 20 MG/1
40 TABLET ORAL DAILY
Qty: 10 TABLET | Refills: 0 | Status: ON HOLD | OUTPATIENT
Start: 2024-01-05 | End: 2024-01-10

## 2024-01-05 RX ORDER — PREDNISONE 20 MG/1
60 TABLET ORAL ONCE
Status: COMPLETED | OUTPATIENT
Start: 2024-01-05 | End: 2024-01-05

## 2024-01-05 RX ORDER — PREDNISONE 20 MG/1
40 TABLET ORAL DAILY
Qty: 10 TABLET | Refills: 0 | Status: SHIPPED | OUTPATIENT
Start: 2024-01-05 | End: 2024-01-05

## 2024-01-06 VITALS
HEIGHT: 61 IN | SYSTOLIC BLOOD PRESSURE: 137 MMHG | TEMPERATURE: 97 F | DIASTOLIC BLOOD PRESSURE: 77 MMHG | OXYGEN SATURATION: 97 % | RESPIRATION RATE: 18 BRPM | BODY MASS INDEX: 24.55 KG/M2 | WEIGHT: 130 LBS | HEART RATE: 75 BPM

## 2024-01-06 NOTE — ED INITIAL ASSESSMENT (HPI)
Arrival via [facility] from [facility] - being treated there for depression and SI. Pt states she had back pain that has been going on since July of this past year. Evaluated for this previously. Only taking naproxen and tylenol with it.

## 2024-01-06 NOTE — ED PROVIDER NOTES
Patient Seen in: [facility] Emergency Department      History     Chief Complaint   Patient presents with    Back Pain     Stated Complaint: back pain    Subjective:   HPI    Patient is a 63-year-old female presents to emergency room for evaluation of back pain.  Patient has had chronic back pain since an injury at work in June.  Patient had fall in April but did not have symptoms after that.  Patient underwent MRI showing L5/S1 disc herniation at outside facility on 7/5/2023.  Patient has pain mainly in the lower back that radiates down her right lower extremity.  Patient also has some pain in the thoracic region.  These pains have been constant for 7 months now.  Patient has been to [facility] for 3 days.  She was taking Tylenol Motrin prior to admission at [facility].  Patient denies abdominal or chest pain.  She denies bowel or bladder incontinence.  No fever.  No history of IV drug abuse.  Sometimes she gets some tingling in her foot.  No leg weakness.  Patient states she has been seen by a specialist.  She had a epidural pain injection in October and is scheduled for another 1 in March.    Objective:   Past Medical History:   Diagnosis Date    Anxiety     Back pain     Bipolar affective (HCC)     Episcleritis 01/01/2012    OS    Episcleritis of both eyes     \"Epiclertis Of Bilateral Eyes\"    Headache disorder     Herpes zoster 01/01/2010    medication    Osteoporosis     Unspecified essential hypertension     controlled without medications, states only elevated when she is smoking cigarettes and drinking energy drinks              Past Surgical History:   Procedure Laterality Date    APPENDECTOMY      APPENDECTOMY      HYSTERECTOMY      TOTAL ABDOM HYSTERECTOMY                  Social History     Socioeconomic History    Marital status:    Tobacco Use    Smoking status: Former     Packs/day: 0.50     Years: 20.00     Additional pack years: 0.00     Total pack years: 10.00     Types: Cigarettes      Quit date: 2016     Years since quittin.2    Smokeless tobacco: Former     Quit date: 2016   Substance and Sexual Activity    Alcohol use: No     Alcohol/week: 0.0 standard drinks of alcohol    Drug use: No   Other Topics Concern    Caffeine Concern Yes     Comment: coffee, tea, 3 cups daily   Social History Narrative    ** Merged History Encounter **                   Review of Systems    Positive for stated complaint: back pain  Other systems are as noted in HPI.  Constitutional and vital signs reviewed.      All other systems reviewed and negative except as noted above.    Physical Exam     ED Triage Vitals   BP 24 155/74   Pulse 24 73   Resp 24 16   Temp 24 97.3 °F (36.3 °C)   Temp src 24 Temporal   SpO2 24 98 %   O2 Device 24 None (Room air)       Current:/77   Pulse 75   Temp 97.3 °F (36.3 °C) (Temporal)   Resp 18   Ht 154.9 cm (5' 1\")   Wt 59 kg   SpO2 97%   BMI 24.56 kg/m²         Physical Exam    Constitutional: Well-appearing in no acute distress  Back: Patient has mild tenderness to the lumbar and thoracic spine in the midline as well as the paralumbar regions.  Lungs: Clear  Cardiovascular: Regular rate and rhythm, normal sinus 2  Abdominal: Soft nontender  Neurological: Bilateral hip flexor strength 5/5. Bilateral Knee flexion/extension strength 5/5. Bilateral Foot dorsiflexion and plantar flexion 5/5 bilaterally. Great toe dorsi flexion 5/5. Normal Sensation..  Gait is normal    ED Course   Labs Reviewed - No data to display        I personally reviewed xray films of thoracic spine and chest and independent interpretation shows pneumonia.  Normal mediastinum.  No fracture.  I also reviewed formal xray report as read by radiology with findings below:    XR THORACIC SPINE (3 VIEWS) (CPT=72072)    Result Date: 2024  PROCEDURE:  XR THORACIC SPINE (3 VIEWS) (CPT=72072)  TECHNIQUE:  AP, lateral,  and swimmer's views of the thoracic spine were obtained.  COMPARISON:  None.  INDICATIONS:  back pain  PATIENT STATED HISTORY: (As transcribed by Technologist)  Patient has had back pain since july. Patient offered no additional history at this time.     FINDINGS:    There is mild apex right upper thoracic curvature.  There is normal kyphosis.  No subluxation.  Normal vertebral body heights.  Multilevel degenerative changes are seen most pronounced in the mid segment, radiographically moderate.  Milder changes are seen elsewhere.             CONCLUSION:  Scoliotic and degenerative changes.   LOCATION:  [facility]    Dictated by (CST): Jeffrey Griggs MD on 1/05/2024 at 11:31 PM     Finalized by (CST): Jeffrey Griggs MD on 1/05/2024 at 11:32 PM       XR CHEST AP/PA (1 VIEW) (CPT=71045)    Result Date: 1/5/2024  PROCEDURE:  XR CHEST AP/PA (1 VIEW) (CPT=71045)  TECHNIQUE:  AP chest radiograph was obtained.  COMPARISON:  None.  INDICATIONS:  back pain  PATIENT STATED HISTORY: (As transcribed by Technologist)  Patient has had back pain since july. Patient offered no additional history at this time.    FINDINGS:  Lung volumes are satisfactory.  No consolidation.  CP angles are sharp.  No pneumothorax.  Heart and pulmonary vessels are normal caliber.  Mediastinal contours are smooth.             CONCLUSION:  No evidence of active cardiopulmonary disease.   LOCATION:  [facility]      Dictated by (CST): Jeffrey Griggs MD on 1/05/2024 at 11:30 PM     Finalized by (CST): Jeffrey Griggs MD on 1/05/2024 at 11:31 PM               MDM      Patient is a 63-year-old female presents emergency room for evaluation of back pain.  Patient with chronic back pain for 7 months now.  History of MRI showing disc herniation in the lumbar spine.  Differential includes compression fracture, lumbar radiculopathy.  No red flag risk factors for back pain.  X-ray of the thoracic spine shows no fracture.  She was given Norco and prednisone here.  She is going to be sent  back to [facility].  I am recommending 5 further days of prednisone.  She should follow-up with her pain specialist for pain injection when she gets out of [facility] as scheduled in March.          External and old record review was performed.  I reviewed MRI from outpatient facility 7/5/2023 with details listed in the [facility]                             Medical Decision Making      Disposition and Plan     Clinical Impression:  1. Lumbar radiculopathy         Disposition:  Discharge  1/5/2024 11:33 pm    Follow-up:  Lakisha Hess MD  [address] [place] [address]  [phone]    Follow up  As needed          Medications Prescribed:  Discharge Medication List as of 1/5/2024 11:34 PM

## 2024-01-06 NOTE — DISCHARGE INSTRUCTIONS
Recommend prednisone for 5 days.  This needs to be prescribed by medical doctor and [facility]

## 2024-01-08 PROBLEM — F31.64 SEVERE MIXED BIPOLAR I DISORDER WITH PSYCHOTIC FEATURES (HCC): Chronic | Status: RESOLVED | Noted: 2024-01-04 | Resolved: 2024-01-08

## 2024-01-09 ENCOUNTER — PATIENT OUTREACH (OUTPATIENT)
Dept: CASE MANAGEMENT | Age: 64
End: 2024-01-09

## 2024-01-09 PROBLEM — F51.05 INSOMNIA RELATED TO ANOTHER MENTAL DISORDER: Status: ACTIVE | Noted: 2024-01-09

## 2024-01-19 ENCOUNTER — TELEPHONE (OUTPATIENT)
Dept: INTERNAL MEDICINE CLINIC | Facility: CLINIC | Age: 64
End: 2024-01-19

## 2024-01-19 ENCOUNTER — PATIENT OUTREACH (OUTPATIENT)
Dept: CASE MANAGEMENT | Age: 64
End: 2024-01-19

## 2024-01-19 DIAGNOSIS — G58.9 PINCHED NERVE: Primary | ICD-10-CM

## 2024-01-19 NOTE — TELEPHONE ENCOUNTER
Patient called and said she has a [employer] HMO plan now, and she will be needing a referral. Patient did not want to book with another provider. She is requesting to speak to Lisa.

## 2024-01-19 NOTE — PROGRESS NOTES
This writer called and left a voice mail for member asking her to call back to complete NIKKO call as well as possible enrollment in CM.

## 2024-01-19 NOTE — TELEPHONE ENCOUNTER
Patient called to request a referral for her upcoming appointment on 1/24 @ 9am with Dr. Youssef in pain management [phone]

## 2024-01-22 ENCOUNTER — PATIENT OUTREACH (OUTPATIENT)
Dept: CASE MANAGEMENT | Age: 64
End: 2024-01-22

## 2024-01-22 ENCOUNTER — LAB ENCOUNTER (OUTPATIENT)
Dept: LAB | Age: 64
End: 2024-01-22
Attending: INTERNAL MEDICINE
Payer: COMMERCIAL

## 2024-01-22 ENCOUNTER — OFFICE VISIT (OUTPATIENT)
Dept: INTERNAL MEDICINE CLINIC | Facility: CLINIC | Age: 64
End: 2024-01-22

## 2024-01-22 VITALS
SYSTOLIC BLOOD PRESSURE: 123 MMHG | HEIGHT: 61 IN | RESPIRATION RATE: 18 BRPM | WEIGHT: 133 LBS | HEART RATE: 81 BPM | BODY MASS INDEX: 25.11 KG/M2 | DIASTOLIC BLOOD PRESSURE: 70 MMHG

## 2024-01-22 DIAGNOSIS — Z13.21 ENCOUNTER FOR VITAMIN DEFICIENCY SCREENING: ICD-10-CM

## 2024-01-22 DIAGNOSIS — N64.9 LESION OF BREAST: ICD-10-CM

## 2024-01-22 DIAGNOSIS — Z00.00 PHYSICAL EXAM, ANNUAL: ICD-10-CM

## 2024-01-22 DIAGNOSIS — D64.89 ANEMIA DUE TO OTHER CAUSE, NOT CLASSIFIED: ICD-10-CM

## 2024-01-22 DIAGNOSIS — M54.16 LUMBAR RADICULOPATHY: ICD-10-CM

## 2024-01-22 DIAGNOSIS — Z12.11 COLON CANCER SCREENING: ICD-10-CM

## 2024-01-22 DIAGNOSIS — Z12.31 SCREENING MAMMOGRAM FOR BREAST CANCER: ICD-10-CM

## 2024-01-22 DIAGNOSIS — Z00.00 PHYSICAL EXAM, ANNUAL: Primary | ICD-10-CM

## 2024-01-22 DIAGNOSIS — Z12.83 SKIN CANCER SCREENING: ICD-10-CM

## 2024-01-22 LAB
ALBUMIN SERPL-MCNC: 4.4 G/DL (ref 3.2–4.8)
ALBUMIN/GLOB SERPL: 1.9 {RATIO} (ref 1–2)
ALP LIVER SERPL-CCNC: 80 U/L
ALT SERPL-CCNC: 31 U/L
ANION GAP SERPL CALC-SCNC: 7 MMOL/L (ref 0–18)
AST SERPL-CCNC: 28 U/L (ref ?–34)
BASOPHILS # BLD AUTO: 0.04 X10(3) UL (ref 0–0.2)
BASOPHILS NFR BLD AUTO: 0.5 %
BILIRUB SERPL-MCNC: 0.5 MG/DL (ref 0.2–1.1)
BUN BLD-MCNC: 12 MG/DL (ref 9–23)
BUN/CREAT SERPL: 16 (ref 10–20)
CALCIUM BLD-MCNC: 9 MG/DL (ref 8.7–10.4)
CHLORIDE SERPL-SCNC: 111 MMOL/L (ref 98–112)
CHOLEST SERPL-MCNC: 182 MG/DL (ref ?–200)
CO2 SERPL-SCNC: 24 MMOL/L (ref 21–32)
CREAT BLD-MCNC: 0.75 MG/DL
DEPRECATED RDW RBC AUTO: 39.3 FL (ref 35.1–46.3)
EGFRCR SERPLBLD CKD-EPI 2021: 90 ML/MIN/1.73M2 (ref 60–?)
EOSINOPHIL # BLD AUTO: 0.17 X10(3) UL (ref 0–0.7)
EOSINOPHIL NFR BLD AUTO: 2.3 %
ERYTHROCYTE [DISTWIDTH] IN BLOOD BY AUTOMATED COUNT: 12.6 % (ref 11–15)
FASTING PATIENT LIPID ANSWER: NO
FASTING STATUS PATIENT QL REPORTED: NO
GLOBULIN PLAS-MCNC: 2.3 G/DL (ref 2.8–4.4)
GLUCOSE BLD-MCNC: 109 MG/DL (ref 70–99)
HCT VFR BLD AUTO: 32.3 %
HDLC SERPL-MCNC: 53 MG/DL (ref 40–59)
HGB BLD-MCNC: 11.2 G/DL
IMM GRANULOCYTES # BLD AUTO: 0.02 X10(3) UL (ref 0–1)
IMM GRANULOCYTES NFR BLD: 0.3 %
LDLC SERPL CALC-MCNC: 111 MG/DL (ref ?–100)
LYMPHOCYTES # BLD AUTO: 2 X10(3) UL (ref 1–4)
LYMPHOCYTES NFR BLD AUTO: 27.1 %
MCH RBC QN AUTO: 29.9 PG (ref 26–34)
MCHC RBC AUTO-ENTMCNC: 34.7 G/DL (ref 31–37)
MCV RBC AUTO: 86.4 FL
MONOCYTES # BLD AUTO: 0.6 X10(3) UL (ref 0.1–1)
MONOCYTES NFR BLD AUTO: 8.1 %
NEUTROPHILS # BLD AUTO: 4.56 X10 (3) UL (ref 1.5–7.7)
NEUTROPHILS # BLD AUTO: 4.56 X10(3) UL (ref 1.5–7.7)
NEUTROPHILS NFR BLD AUTO: 61.7 %
NONHDLC SERPL-MCNC: 129 MG/DL (ref ?–130)
OSMOLALITY SERPL CALC.SUM OF ELEC: 294 MOSM/KG (ref 275–295)
PLATELET # BLD AUTO: 324 10(3)UL (ref 150–450)
POTASSIUM SERPL-SCNC: 3.8 MMOL/L (ref 3.5–5.1)
PROT SERPL-MCNC: 6.7 G/DL (ref 5.7–8.2)
RBC # BLD AUTO: 3.74 X10(6)UL
SODIUM SERPL-SCNC: 142 MMOL/L (ref 136–145)
TRIGL SERPL-MCNC: 102 MG/DL (ref 30–149)
TSI SER-ACNC: 1.71 MIU/ML (ref 0.55–4.78)
VIT D+METAB SERPL-MCNC: 23.7 NG/ML (ref 30–100)
VLDLC SERPL CALC-MCNC: 17 MG/DL (ref 0–30)
WBC # BLD AUTO: 7.4 X10(3) UL (ref 4–11)

## 2024-01-22 PROCEDURE — 36415 COLL VENOUS BLD VENIPUNCTURE: CPT

## 2024-01-22 PROCEDURE — 3078F DIAST BP <80 MM HG: CPT | Performed by: INTERNAL MEDICINE

## 2024-01-22 PROCEDURE — 82306 VITAMIN D 25 HYDROXY: CPT

## 2024-01-22 PROCEDURE — 3074F SYST BP LT 130 MM HG: CPT | Performed by: INTERNAL MEDICINE

## 2024-01-22 PROCEDURE — 80061 LIPID PANEL: CPT

## 2024-01-22 PROCEDURE — 84466 ASSAY OF TRANSFERRIN: CPT

## 2024-01-22 PROCEDURE — G0438 PPPS, INITIAL VISIT: HCPCS | Performed by: INTERNAL MEDICINE

## 2024-01-22 PROCEDURE — 80053 COMPREHEN METABOLIC PANEL: CPT

## 2024-01-22 PROCEDURE — 85025 COMPLETE CBC W/AUTO DIFF WBC: CPT

## 2024-01-22 PROCEDURE — 82728 ASSAY OF FERRITIN: CPT

## 2024-01-22 PROCEDURE — 84443 ASSAY THYROID STIM HORMONE: CPT

## 2024-01-22 PROCEDURE — 3008F BODY MASS INDEX DOCD: CPT | Performed by: INTERNAL MEDICINE

## 2024-01-22 PROCEDURE — 83540 ASSAY OF IRON: CPT

## 2024-01-22 PROCEDURE — 99386 PREV VISIT NEW AGE 40-64: CPT | Performed by: INTERNAL MEDICINE

## 2024-01-22 NOTE — PROGRESS NOTES
Subjective:     Patient ID: Juana Adler is a 63 year old female.  Marybeth for physical exam    HPI  Patient is new to the practice, states that she had primary care physician from [facility], since July bothered by low back pain radiating down the right leg, to the point that feels like right leg is weaker.  MRI of the lumbar spine performed at [facility] in July 2023 showed mild degenerative changes at L5-S1 with a right lateral disc protrusion and diffusely prominent epidural fat resultant and mild central spinal stenosis in the right lateral recess narrowing .  Patient states that she was seen by neurosurgeon who did not want to operate and she does not want surgery wants to exhaust conservative measures, would like to see a pain specialist and see if epidural injection would help, she never done physical therapy for that.  Your records available in epic patient already underwent epidural injection several months ago which was not helpful.  She is being treated for depression and bipolar disorder, recently was treated at [facility] inpatient.  When asked why she was admitted there states because she would not take her medication but now feels [facility] is doing the job she is less depressed, will be following up with psychiatrist as outpatient.  Patient is due for colonoscopy requesting referral    Review of Systems       Constitutional:  Negative for decreased activity, fever, irritability and lethargy  Cardiovascular:  Negative for chest pain and irregular heartbeat/palpitations  Respiratory:  Negative for cough, dyspnea and wheezing.  Eyes:  Negative for eye discharge and vision loss  Endocrine:  Negative for polydipsia and polyphagia  Integumentary:  Negative for pruritus and rash  Neurological:  Negative for gait disturbance, paresthesias.   Psychiatric:  Negative for inappropriate interaction and psychiatric symptoms  Current Outpatient Medications   Medication Sig Dispense Refill     pregabalin 75 MG Oral Cap Take 1 capsule (75 mg total) by mouth 2 (two) times daily. 60 capsule 0    zolpidem 10 MG Oral Tab Take 1 tablet (10 mg total) by mouth nightly. 30 tablet 0    lurasidone 60 MG Oral Tab Take 1 tablet (60 mg total) by mouth daily with breakfast. 30 tablet 0    alendronate 70 MG Oral Tab Take 1 tablet (70 mg total) by mouth once a week.      naproxen 500 MG Oral Tab Take 1 tablet (500 mg total) by mouth 2 (two) times daily as needed. (Patient not taking: Reported on 1/22/2024)       Allergies:No Known Allergies    Past Medical History:   Diagnosis Date    Anxiety     Back pain     Bipolar affective (HCC)     Episcleritis 01/01/2012    OS    Episcleritis of both eyes     \"Epiclertis Of Bilateral Eyes\"    Headache disorder     Herpes zoster 01/01/2010    medication    Osteoporosis     Unspecified essential hypertension     controlled without medications, states only elevated when she is smoking cigarettes and drinking energy drinks      Past Surgical History:   Procedure Laterality Date    APPENDECTOMY      APPENDECTOMY      HYSTERECTOMY      TOTAL ABDOM HYSTERECTOMY        Family History   Problem Relation Age of Onset    Heart Disease Father     Arthritis Father     Alcohol and Other Disorders Associated Mother     Cancer Other         pancreatic, family hx    Colon Cancer Other         family hx    Glaucoma Neg     Macular degeneration Neg     Diabetes Neg       Social History:   Social History     Socioeconomic History    Marital status:    Tobacco Use    Smoking status: Former     Packs/day: 0.50     Years: 20.00     Additional pack years: 0.00     Total pack years: 10.00     Types: Cigarettes     Passive exposure: Past    Smokeless tobacco: Former     Quit date: 9/21/2016   Vaping Use    Vaping Use: Never used   Substance and Sexual Activity    Alcohol use: No     Alcohol/week: 0.0 standard drinks of alcohol    Drug use: Not Currently   Other Topics Concern    Caffeine Concern Yes      Comment: coffee, tea, 3 cups daily   Social History Narrative    ** Merged History Encounter **             /70 (BP Location: Right arm, Patient Position: Sitting, Cuff Size: adult)   Pulse 81   Resp 18   Ht 5' 1\" (1.549 m)   Wt 133 lb (60.3 kg)   BMI 25.13 kg/m²    Physical Exam  Constitutional:       Appearance: Normal appearance. She is ill-appearing.   HENT:      Head: Normocephalic and atraumatic.   Eyes:      General: No scleral icterus.     Extraocular Movements: Extraocular movements intact.      Conjunctiva/sclera: Conjunctivae normal.      Pupils: Pupils are equal, round, and reactive to light.   Cardiovascular:      Rate and Rhythm: Normal rate and regular rhythm.      Heart sounds: No murmur heard.     No gallop.   Pulmonary:      Effort: Pulmonary effort is normal. No respiratory distress.      Breath sounds: No wheezing or rhonchi.   Abdominal:      General: Bowel sounds are normal.      Palpations: Abdomen is soft. There is no mass.      Tenderness: There is no guarding or rebound.   Musculoskeletal:         General: Normal range of motion.      Cervical back: Normal range of motion and neck supple.      Right lower leg: No edema.      Left lower leg: No edema.   Lymphadenopathy:      Cervical: No cervical adenopathy.   Skin:     General: Skin is warm.   Neurological:      General: No focal deficit present.      Mental Status: She is alert and oriented to person, place, and time. Mental status is at baseline.      Gait: Gait normal.      Deep Tendon Reflexes: Reflexes normal.      Comments: Straight leg raising positive about 40 degrees on the right.  Patient is able to do heel and toe walk.  Weakness on dorsiflexion left?  More than right   Psychiatric:         Attention and Perception: She is inattentive.         Mood and Affect: Mood is anxious.         Behavior: Behavior is cooperative.         Judgment: Judgment is inappropriate.         Assessment & Plan:   1. Physical exam,  annual reviewed importance of healthy diet regular meals, maintaining healthy weight, will check labs   2. Encounter for vitamin deficiency screening    3. Lumbar radiculopathy see pain specialist referral provided   4. Screening mammogram for breast cancer    5. Skin cancer screening see dermatology   6. Lesion of breast see dermatology get mammogram done   7. Colon cancer screening referred patient to see gastroenterology       Orders Placed This Encounter   Procedures    Comp Metabolic Panel (14)    CBC With Differential With Platelet    Lipid Panel    TSH W Reflex To Free T4    Vitamin D   Follow-up in 6 months or as needed    Meds This Visit:  Requested Prescriptions      No prescriptions requested or ordered in this encounter       Imaging & Referrals:  OP REFERRAL PAIN MANGEMENT  DERM - INTERNAL  GASTRO - INTERNAL  SAMIRA ALMITA 2D+3D SCREENING BILAT (CPT=77067/08405)

## 2024-01-23 ENCOUNTER — PATIENT OUTREACH (OUTPATIENT)
Dept: CASE MANAGEMENT | Age: 64
End: 2024-01-23

## 2024-01-23 DIAGNOSIS — D64.89 ANEMIA DUE TO OTHER CAUSE, NOT CLASSIFIED: Primary | ICD-10-CM

## 2024-01-23 LAB
DEPRECATED HBV CORE AB SER IA-ACNC: 53.2 NG/ML
IRON SATN MFR SERPL: 8 %
IRON SERPL-MCNC: 31 UG/DL
TIBC SERPL-MCNC: 370 UG/DL (ref 250–425)
TRANSFERRIN SERPL-MCNC: 248 MG/DL (ref 250–380)

## 2024-01-23 NOTE — PROGRESS NOTES
Name: Juana Adler   : 68    Assessment obtained via: Phone [phone]        CASE CLOSED DATE/ REASON FOR CLOSURE:      DIAGNOSIS/ TREATMENT:    Mental Health Diagnosis:  Bipolar Mixed with Psychosis, CHARLIE   Medical Comorbities:  Osteoporosis, Lumbar Disc Herniation which causes ongoing chronic pain.   Behavioral Health History:  Patient is a poor historian. However, she did say she was first diagnosed with mental health issues as a teenager and has required many hospitalizations since that time. As per EMR she has problems with medication compliance and family reports she has required hospitalization approx. 3 times per year recently. For a while she was able to go 5 years without requiring any hospitalization. Hospitalized this month from 24-24.    Current Health Status:  Discharged from inpatient on 24 and has since started IOP. Tangential and at times had some limitations when responding to this writer's questions.   Clinical Rationale for Case Management:  Patient has required numerous hospitalizations including one this month.    Cognitive Defects:  Unknown. Had some difficulty responding to questions.    If Yes, Explain limitations:     Physical Limitations:  None noted   If Yes, Explain limitations:      Living Situation:  Lives alone with two pets, a cat and a bird.        CONDITION MONITORING:    Patient/Caregiver verbalized understanding of diagnosis: No   Patient/Caregiver verbalized method of monitoring illness: Yes. Reports that if she starts getting irritable with her sister or her daughter, this is a warning sign. This writer pointed out this is good insight.          ADHERENCE TO TREATMENT PLANS:    Patient completed treatment recommendations by physician? Unk   Patient/Caregiver stated reason for not completing:          MEDICATIONS:    Current Medications:  Lurasidone 60 mg QAM, Pregabalin 75 mg BID, Zolpidem 10 mg QHS.         Patient/Caregiver verbalized compliance  with medications No.   Patient/Caregiver stated reason for noncompliance:    Admits she has been non-compliant in the past because she starts to feel well and tells herself she does not need them anymore.      HEALTH BEHAVIORS/ BARRIERS:    Identified behaviors that may impede patient's ability to manage their disease:    Hx of Med non-compliance   Identified healthy behaviors patient is currently practicing:   Reports using relaxation techniques such as Meditation and “97024.”       GOALS FOR PATIENT'S CASE: Juana will be medication compliant for the next 90 days. (Barrier is previous non-compliance and belief that she does not need the meds). 2. Juana will follow up with her new APRN on 2/21/24 for medication management. (Barrier is previous non-compliance) 3. Juana will set up an appointment with a therapist of her choice withing the next 14 days. (Barrier is it appears member gets easily overwhelmed. )         ADDITIONAL COMMENTS:    This writer had a lengthy discussion with Juana for a NIKKO call and she agreed to CM also. She was hospitalized for severe S/I as well as She reports feeling OK and she finds that IOP has been very beneficial to her as she believes she is learning a lot. She did describe all the problems she has with her medical conditions and how this has been very frustrating for her. She has been setting up appointments, but it has become overwhelming because of her recent switch to the HMO. For mental health she has a psychiatrist lined up but will need a therapist. Will send her a list as she prefers all females. She also agreed to have me send copy to her CT in the IOP Program.        Next Follow Up Date:    Call on or near 12/23/24   Reason For Follow Up:   Monitor how member is structuring her time particularly if she is not in IOP or working.

## 2024-01-23 NOTE — PROGRESS NOTES
Name: Juana Adler       : 1960   Gender: female   Race: White   Ethnicity: NON  OR  OR  ETHNICITY   Employer Group:   None     Insurance Information:        Medical Group Name: [facility]   Insurance Type: [employer]   Patient Address:    [address] [address]   Patient Telephone Number: Telephone Information:   Home Phone [phone]   Mobile [phone]        Primary Care Physician  Lakisha Hess MD            Inpatient Discharge Date 24   Medications Reconciled  Yes     Discharge Plan:     Does patient display understanding of discharge plan level of care? (patient can verbalize what level of care is recommended) Yes   Does patient have accurate information on length of stay? (accurate for level of care and need for on-going care) No   Does patient have accurate information on expectations for level of care? (patient can verbalize that PHP is full days, 5 days a week; IOP is half days, 5 days a week; EDP program, Therapy 1x per week, 2x per week, etc) Yes         Outpatient Therapist:     Does patient have an established outpatient therapist? No   If no, was patient given in network referrals while inpatient? No   DID CM inform member that appointments with outpatient therapist are encouraged and covered when in outpatient PHP/IOP programming and encourage weekend appointment setting for additional support when not in program?  Yes       CONTACT NOTES (including any changes to medications): Spoke with member and completed NIKKO. She is currently in IOP and truly enjoying same. Has appointment on 24 with MARY PENNINGTON. Will need a therapist and I told her I will e-mail list to her. Prefers female. Meds are: Lurasidone 60 mg QAM, Pregabalin 75 mg BID, Zolpidem 10 mg QHS. Agreed to CM.

## 2024-01-27 ENCOUNTER — WALK IN (OUTPATIENT)
Dept: URGENT CARE | Age: 64
End: 2024-01-27

## 2024-01-27 VITALS
SYSTOLIC BLOOD PRESSURE: 119 MMHG | RESPIRATION RATE: 16 BRPM | DIASTOLIC BLOOD PRESSURE: 73 MMHG | TEMPERATURE: 98 F | OXYGEN SATURATION: 97 % | HEART RATE: 92 BPM

## 2024-01-27 DIAGNOSIS — H15.103 EPISCLERITIS OF BOTH EYES: Primary | ICD-10-CM

## 2024-01-27 RX ORDER — PREDNISOLONE ACETATE 10 MG/ML
1 SUSPENSION/ DROPS OPHTHALMIC 3 TIMES DAILY
Qty: 5 ML | Refills: 0 | Status: SHIPPED | OUTPATIENT
Start: 2024-01-27

## 2024-01-27 RX ORDER — ZOLPIDEM TARTRATE 10 MG/1
1 TABLET ORAL NIGHTLY
COMMUNITY
Start: 2024-01-16

## 2024-01-27 RX ORDER — PREDNISONE 20 MG/1
TABLET ORAL
COMMUNITY
Start: 2023-08-01 | End: 2024-01-27 | Stop reason: ALTCHOICE

## 2024-01-27 RX ORDER — BUPROPION HYDROCHLORIDE 300 MG/1
300 TABLET ORAL EVERY MORNING
COMMUNITY
Start: 2023-12-10

## 2024-01-27 RX ORDER — FLUTICASONE PROPIONATE 50 MCG
2 SPRAY, SUSPENSION (ML) NASAL DAILY
COMMUNITY
Start: 2023-11-11

## 2024-01-27 RX ORDER — TRAMADOL HYDROCHLORIDE 50 MG/1
TABLET ORAL
COMMUNITY
Start: 2023-08-01

## 2024-01-27 RX ORDER — ALBUTEROL SULFATE 90 UG/1
2 AEROSOL, METERED RESPIRATORY (INHALATION) EVERY 4 HOURS PRN
COMMUNITY
Start: 2023-11-01

## 2024-01-27 RX ORDER — PREGABALIN 50 MG/1
CAPSULE ORAL
COMMUNITY
Start: 2023-08-22

## 2024-01-27 RX ORDER — NAPROXEN 500 MG/1
1 TABLET ORAL
COMMUNITY
Start: 2023-08-08

## 2024-01-27 RX ORDER — HYDROCODONE BITARTRATE AND ACETAMINOPHEN 5; 325 MG/1; MG/1
1 TABLET ORAL EVERY 6 HOURS PRN
COMMUNITY
Start: 2023-12-29

## 2024-01-27 RX ORDER — PREGABALIN 75 MG/1
75 CAPSULE ORAL
COMMUNITY
Start: 2024-01-16

## 2024-01-27 RX ORDER — AZITHROMYCIN 250 MG/1
TABLET, FILM COATED ORAL
COMMUNITY
Start: 2023-11-02 | End: 2024-01-27 | Stop reason: ALTCHOICE

## 2024-01-27 RX ORDER — LISINOPRIL 5 MG/1
5 TABLET ORAL DAILY
COMMUNITY
Start: 2023-08-03

## 2024-01-27 RX ORDER — LURASIDONE HYDROCHLORIDE 60 MG/1
60 TABLET, FILM COATED ORAL
COMMUNITY
Start: 2024-01-17

## 2024-01-27 RX ORDER — ALENDRONATE SODIUM 70 MG/1
70 TABLET ORAL
COMMUNITY
Start: 2023-12-21

## 2024-01-27 RX ORDER — CYCLOBENZAPRINE HCL 10 MG
10 TABLET ORAL AT BEDTIME
COMMUNITY
Start: 2023-09-14

## 2024-01-31 ENCOUNTER — TELEPHONE (OUTPATIENT)
Dept: INTERNAL MEDICINE CLINIC | Facility: CLINIC | Age: 64
End: 2024-01-31

## 2024-01-31 DIAGNOSIS — H15.109 EPISCLERITIS, UNSPECIFIED LATERALITY: Primary | ICD-10-CM

## 2024-01-31 NOTE — TELEPHONE ENCOUNTER
Patient is requesting referral.     Name of specialist and specialty department : Dr. Irving, [place], [place]  Reason for visit with the specialist: Epicleringe, I.C f/up  Address of the specialist office:[address] [place] [address]  Appointment date: N/A         CSS informed patient the turnaround time for referral is 5-7 business days.  Patient was informed to check their [facility] for referral status.

## 2024-02-02 ENCOUNTER — APPOINTMENT (OUTPATIENT)
Dept: OPHTHALMOLOGY | Age: 64
End: 2024-02-02

## 2024-02-02 ENCOUNTER — TELEPHONE (OUTPATIENT)
Dept: OPHTHALMOLOGY | Facility: CLINIC | Age: 64
End: 2024-02-02

## 2024-02-02 NOTE — TELEPHONE ENCOUNTER
Per pt has episcleritis flare up, states she was seen at , asking for appt next week. Please advise thank you.

## 2024-02-06 NOTE — TELEPHONE ENCOUNTER
Called patient, she said she had an episcleritis flare up and went to Urgent Care last week.  They gave her a steroid prescription and the eye has improved.  I offered her an appt. For 2/7/24 or 2/8/24 but she felt that [place] is too far away.  She said she would call her previous eye doctor at [facility].  EP

## 2024-02-08 ENCOUNTER — TELEPHONE (OUTPATIENT)
Dept: INTERNAL MEDICINE CLINIC | Facility: CLINIC | Age: 64
End: 2024-02-08

## 2024-02-08 DIAGNOSIS — M54.16 LUMBAR RADICULITIS: ICD-10-CM

## 2024-02-08 RX ORDER — LURASIDONE HYDROCHLORIDE 60 MG/1
60 TABLET, FILM COATED ORAL
Qty: 30 TABLET | Refills: 0 | OUTPATIENT
Start: 2024-02-08

## 2024-02-08 RX ORDER — PREGABALIN 75 MG/1
75 CAPSULE ORAL 2 TIMES DAILY
Qty: 60 CAPSULE | Refills: 0 | OUTPATIENT
Start: 2024-02-08

## 2024-02-08 NOTE — TELEPHONE ENCOUNTER
Patient states she only has 7 pills left of her medication. She is going to see a new psychiatrist \"Dr. Reilly\" Patient was told the new psychiatrist can't fill the scripts until her appointment at the end of the month. She was told to reach out to her PCP for refills.

## 2024-02-08 NOTE — TELEPHONE ENCOUNTER
I called and left message for patient to call us back - we have not filled these for patient before, who was filling for her     Is she seeing a psych provider and are they able to refill this for her or is she wanting us to ask Dr. Cody?

## 2024-02-08 NOTE — TELEPHONE ENCOUNTER
Patient requesting refills of:  lurasidone 60 MG Oral Tab   pregabalin 75 MG Oral Cap   She does not have a physiatrist appointment until the end of the month, and will be out of medication prior to that.

## 2024-02-09 NOTE — TELEPHONE ENCOUNTER
OPERATIVE PROCEDURE NOTE    ADMISSION DATE: 3/3/2017    EXAM DATE: 3/3/2017    PREOPERATIVE DIAGNOSIS:  Calcific tendinitis right shoulder    POSTOPERATIVE DIAGNOSIS:  Same    OPERATIVE PROCEDURE:  Right shoulder arthroscopy with subacromial decompression, acromioplasty, excision of calcific tendinitis    SURGEON:  Josué Branch M.D.    ASSISTANT:  FARTUN Allen    ANESTHESIA:  Gen. with scalene block    BLOOD LOSS:  Negligible    COMPLICATIONS:  None.    IMPLANTS:  None    DESCRIPTION OF PROCEDURE:  Patient was taken to the operative. Scalene block and general anesthesia were administered. Patient received Ancef IV perioperatively. The right shoulder was prepped in usual manner. The right shoulder was insufflated with 60 cc of saline. Standard posterior portal was made. Anterior portal established under direct vision. Intra-articular portion of the biceps appeared normal. Glenohumeral joint surfaces showed some mild to moderate degenerative changes but no unstable articular cartilage segments were appreciated. Subscapularis tendon was normal as was the axillary pouch. The labrum was normal throughout. Undersurface of the rotator cuff tendon demonstrated some partial tearing at the supraspinatus which was debrided with a shaver. Minimal involvement of the footprint was noted. The scope was then placed into the subacromial space. Significant bursitis was identified. Lateral portal was established under direct vision. Thorough bursectomy was accomplished with motorized dillon and cautery. Large calcific densities were appreciated throughout the superior surface of the rotator cuff insertion site including the interspinous and supraspinatus as well as a small portion of the biceps longus. These were debulked and debrided with combination of probes, basket forceps and dillon. Once completed, there was no significant defect in the rotator cuff tendon for repair. Standard acromioplasty was then performed using  Patient calling ( identified name and  )  informed of 's response below    Patient will call her psychiatrist  as directed        a combination of burrs and dillon. The coracoacromial ligament was partially resected in doing so. Once completed the subacromial space was lavaged. The arthroscopic and cement were removed. The portals were closed with nylon. Sterile dressings including a basket sling were applied. The patient was awakened and transferred to the recovery room.

## 2024-02-09 NOTE — TELEPHONE ENCOUNTER
Please call patient I decline refill medication that were prescribed by psychiatrist she needs to request refills from psychiatrist I am not familiar with this medications.

## 2024-02-13 PROBLEM — R73.03 PREDIABETES: Status: ACTIVE | Noted: 2024-02-13

## 2024-02-14 RX ORDER — LURASIDONE HYDROCHLORIDE 60 MG/1
60 TABLET, FILM COATED ORAL
Qty: 30 TABLET | Refills: 0 | OUTPATIENT
Start: 2024-02-14

## 2024-02-27 ENCOUNTER — PATIENT OUTREACH (OUTPATIENT)
Dept: CASE MANAGEMENT | Age: 64
End: 2024-02-27

## 2024-03-11 PROBLEM — F31.2 SEVERE MANIC BIPOLAR I DISORDER WITH PSYCHOTIC FEATURES (HCC): Status: ACTIVE | Noted: 2024-03-11

## 2024-03-16 PROCEDURE — 93010 ELECTROCARDIOGRAM REPORT: CPT | Performed by: INTERNAL MEDICINE

## 2024-03-21 ENCOUNTER — OFFICE VISIT (OUTPATIENT)
Facility: CLINIC | Age: 64
End: 2024-03-21

## 2024-03-21 ENCOUNTER — NURSE ONLY (OUTPATIENT)
Dept: LAB | Facility: HOSPITAL | Age: 64
End: 2024-03-21
Payer: COMMERCIAL

## 2024-03-21 ENCOUNTER — TELEPHONE (OUTPATIENT)
Facility: CLINIC | Age: 64
End: 2024-03-21

## 2024-03-21 VITALS
BODY MASS INDEX: 26.06 KG/M2 | SYSTOLIC BLOOD PRESSURE: 143 MMHG | DIASTOLIC BLOOD PRESSURE: 86 MMHG | HEIGHT: 61 IN | WEIGHT: 138 LBS | HEART RATE: 87 BPM

## 2024-03-21 DIAGNOSIS — R09.A2 GLOBUS SENSATION: ICD-10-CM

## 2024-03-21 DIAGNOSIS — R12 HEARTBURN: ICD-10-CM

## 2024-03-21 DIAGNOSIS — R13.10 DYSPHAGIA, UNSPECIFIED TYPE: ICD-10-CM

## 2024-03-21 DIAGNOSIS — Z12.11 COLON CANCER SCREENING: Primary | ICD-10-CM

## 2024-03-21 DIAGNOSIS — Z86.010 PERSONAL HISTORY OF COLONIC POLYPS: ICD-10-CM

## 2024-03-21 DIAGNOSIS — K62.5 BRBPR (BRIGHT RED BLOOD PER RECTUM): ICD-10-CM

## 2024-03-21 DIAGNOSIS — R13.19 ESOPHAGEAL DYSPHAGIA: ICD-10-CM

## 2024-03-21 DIAGNOSIS — K59.09 CHRONIC CONSTIPATION: ICD-10-CM

## 2024-03-21 DIAGNOSIS — K62.5 BRIGHT RED BLOOD PER RECTUM: ICD-10-CM

## 2024-03-21 DIAGNOSIS — Z86.010 HX OF COLONIC POLYPS: ICD-10-CM

## 2024-03-21 DIAGNOSIS — K59.00 CONSTIPATION, UNSPECIFIED CONSTIPATION TYPE: ICD-10-CM

## 2024-03-21 DIAGNOSIS — D64.9 NORMOCYTIC ANEMIA: ICD-10-CM

## 2024-03-21 PROCEDURE — 3079F DIAST BP 80-89 MM HG: CPT

## 2024-03-21 PROCEDURE — 83013 H PYLORI (C-13) BREATH: CPT

## 2024-03-21 PROCEDURE — 3077F SYST BP >= 140 MM HG: CPT

## 2024-03-21 PROCEDURE — 3008F BODY MASS INDEX DOCD: CPT

## 2024-03-21 PROCEDURE — 99204 OFFICE O/P NEW MOD 45 MIN: CPT

## 2024-03-21 RX ORDER — PANTOPRAZOLE SODIUM 40 MG/1
40 TABLET, DELAYED RELEASE ORAL
Qty: 90 TABLET | Refills: 0 | Status: SHIPPED | OUTPATIENT
Start: 2024-03-21 | End: 2024-06-19

## 2024-03-21 NOTE — TELEPHONE ENCOUNTER
Scheduled for:  Colonoscopy 30779 EGD [id number]  Provider Name:  Dr. Gupta   Date:  05/22/2024  Location:[facility]  Sedation:  MAC  Time: 12:30pm (pt is aware that [facility] will call the day before to confirm arrival time)    Prep:  Trilyte Prep Instructions Given At The Office Visit.    Meds/Allergies Reconciled?:  ADITI Nicole Reviewed  Diagnosis with codes:  Colon Screening Z12.11/ BRBPR K62.5/ Globus Sensation R09.89/ hx polyps Z86.010, chronic constipation K59.00, dysphagia R13.10  Was patient informed to call insurance with codes (Y/N):  Yes  Referral sent?:  Referral was sent at the time of electronic surgical scheduling.  EM or [facility] notified?:  I sent an electronic request to Endo Scheduling and received a confirmation today.  Medication Orders:  Pt is aware to NOT take iron pills, herbal meds and diet supplements for 7 days before exam. Also to NOT take any form of alcohol, recreational drugs and any forms of ED meds 24 hours before exam.   Misc Orders:       Further instructions given by staff:  I provide prep instructions to patient at the time of the appointment and reviewed date, time and location, she verbalized that she understood and is aware to call if she has any questions.    Patient was informed about the new cancellation policy for his/her procedure. Patient was also given a copy of the cancellation policy at the time of the appointment and verbalized understanding.

## 2024-03-21 NOTE — PATIENT INSTRUCTIONS
For constipation and leaking stool:    -continue with probiotic    -take a fiber supplement everyday, such as metamucil, take 1-2x daily     -if still constipated THEN ADD miralax (osmotic laxative), take 3-7 days per week     -can put on zinc oxide paste (diaper cream found in baby aisle by diaper) on skin to avoid irritation     For anal pain recommendations:  -use over the counter hemorrhoid cream 2x daily for 7 days at a time (hydrocortisone cream)  -can do sitz bath during flare  -use wet wipes (baby wipes) instead of toilet paper   -avoid sitting on toilet for long periods of time and avoid straining      For trouble swallowing:  -go to lab on 1st floor for H. Pylori test    -after H. Pylori test start taking pantoprazole 40mg once a day, take on empty stomach.     -call to schedule xray esophagus [phone]      -follow up with me 1 month after endoscopy    ----------------------------------------------------------------      1. Schedule EGD & colonoscopy with Dr. Gupta, Dr. Peng or Dr. Bess   Diagnosis: CRC screen, hx polyps, chronic constipation, BRBPR, dysphagia, globus sensation  Sedation: MAC  Prep: split dose golytely    2.  bowel prep from pharmacy   You can pick the bowel prep up now and store in a cool, dry place in your home until your scheduled bowel prep start date.    3. Continue all medications as normal for your procedure. DO NOT TAKE: Any form of alcohol, recreational drugs and any forms of erectile dysfunction medications 24 hours prior to procedure.    4. Read all bowel prep instructions carefully. Bowel prep instructions can also be found online at:  [email or web address]     5. AVOID seeds, nuts, popcorn, raw fruits and vegetables for 5 days before procedure    6. If you start any NEW medication after your visit today, please notify us. Certain medications (like iron or weight loss medications) will need to be held before the procedure, or the procedure cannot be  performed safely.

## 2024-03-21 NOTE — H&P
[facility] - Gastroenterology                                                                                                  Clinic History and Physical     Chief Complaint   Patient presents with    Consult     Last CLN 2022; constipation        Requesting physician or provider: Lakisha Hess MD    HPI:   Juana Adler is a 63 year old year-old female with medical history including osteoporosis, HTN, bipolar affective, anxiety, and other conditions in table below who presents for colon cancer screening evaluation.    #BRBPR  -one episode 1 week ago she passed flatus and later when she went to the bathroom noticed she ad bright red blood in underwear. She didn't feel the blood when it came out. Otherwise denies blood in stool or when wiping  -the past week having anal pain, hurts when sitting    #constipation  -reports feeling constipated frequently and then will notice small dots of stool in underwear, she thinks it is leaking out from rectum when she walks around. She tried not to strain during bowel movements    #dysphagia  #globus sensation  #heartburn  -reports dysphagia for 2 years, with foods or liquids. Has globus sensation at bottom of neck almost everyday when eating dinner. Better if she chews food really small and eats slow. Takes tums 2-3 x per week for heartburn. She is unable to elaborate further, says she tries to ignore it.    Patient is here today as a referral from her PCP for evaluation prior to undergoing colonoscopy for CRC screening. Patient denies any GI symptoms of nausea, vomiting, hematemesis, abdominal pain, change in bowel habits, diarrhea, hematochezia, or melena.  Additionally there is no weight loss and no reported chest pain or shortness of breath.     Pt is due for CRC screening. We discussed the available screening options for CRC such as FIT and cologuard. They are electing to pursue colonoscopy at this time.     Last colonoscopy: 2019 @outside facility, pt unsure  where - 5 year recall per patient  -polyps per patient  -no record in care everywhere    Last EGD: none     NSAIDS: naproxen 2x daily  Tobacco: former 2023  Alcohol:none   Marijuana:none   Illicit drugs:none     FH GI malignancy: maternal grandma - colon cancer, maternal aunt - colon cancer, Mom-pancreatic cancer    No history of adverse reaction to sedation  No RAMILA  No anticoagulants  No pacemaker/defibrillator  No pain medications and/or sleep aides    Wt Readings from Last 6 Encounters:   03/21/24 138 lb (62.6 kg)   02/13/24 134 lb (60.8 kg)   01/22/24 133 lb (60.3 kg)   01/05/24 130 lb (59 kg)   03/21/16 144 lb 11.2 oz (65.6 kg)   07/30/15 125 lb (56.7 kg)          History, Medications, Allergies, ROS:      Past Medical History:   Diagnosis Date    Anxiety     Back pain     Bipolar affective (HCC)     Episcleritis 01/01/2012    OS    Episcleritis of both eyes     \"Epiclertis Of Bilateral Eyes\"    Headache disorder     Herpes zoster 01/01/2010    medication    Osteoporosis     Unspecified essential hypertension     controlled without medications, states only elevated when she is smoking cigarettes and drinking energy drinks      Past Surgical History:   Procedure Laterality Date    APPENDECTOMY      APPENDECTOMY      HYSTERECTOMY      TOTAL ABDOM HYSTERECTOMY        Family Hx:   Family History   Problem Relation Age of Onset    Alcohol and Other Disorders Associated Mother     Pancreatic Cancer Mother     Heart Disease Father     Arthritis Father     Colon Cancer Maternal Grandmother     Cancer Other         pancreatic, family hx    Colon Cancer Other         family hx    Colon Cancer Maternal Aunt     Glaucoma Neg     Macular degeneration Neg     Diabetes Neg       Social History:   Social History     Socioeconomic History    Marital status:    Tobacco Use    Smoking status: Former     Packs/day: 0.50     Years: 20.00     Additional pack years: 0.00     Total pack years: 10.00     Types: Cigarettes      Quit date: 2023     Years since quittin.3     Passive exposure: Past    Smokeless tobacco: Former     Quit date: 2016   Vaping Use    Vaping Use: Never used   Substance and Sexual Activity    Alcohol use: No     Alcohol/week: 0.0 standard drinks of alcohol    Drug use: Not Currently   Other Topics Concern    Caffeine Concern Yes     Comment: coffee, tea, 3 cups daily   Social History Narrative    ** Merged History Encounter **          Social Determinants of Health     Financial Resource Strain: Low Risk  (3/12/2024)    Financial Resource Strain     Med Affordability: No   Food Insecurity: No Food Insecurity (3/12/2024)    Food Insecurity     Food Insecurity: Never true   Transportation Needs: No Transportation Needs (3/12/2024)    Transportation Needs     Lack of Transportation: No   Housing Stability: Low Risk  (3/12/2024)    Housing Stability     Housing Instability: No        Medications (Active prior to today's visit):  Current Outpatient Medications   Medication Sig Dispense Refill    polyethylene glycol, PEG 3350-KCl-NaBcb-NaCl-NaSulf, 236 g Oral Recon Soln Take 4,000 mL by mouth As Directed. Take 2,000 mL the night before your procedure and 2,000 mL the morning of your procedure. Take as directed by GI clinic. Okay to substitute for generic. 1 each 0    pantoprazole 40 MG Oral Tab EC Take 1 tablet (40 mg total) by mouth every morning before breakfast. 90 tablet 0    [START ON 4/15/2024] ARIPiprazole  MG Intramuscular Suspension Reconstituted ER Inject 2 mL (400 mg total) into the muscle one time for 1 dose. 1 each 0    ARIPiprazole 15 MG Oral Tab Take 1 tablet (15 mg total) by mouth daily for 14 days. 14 tablet 0    alendronate 70 MG Oral Tab Take 1 tablet (70 mg total) by mouth once a week. Pt takes medication on       zolpidem 10 MG Oral Tab Take 1 tablet (10 mg total) by mouth nightly. 30 tablet 0       Allergies:  No Known Allergies    ROS:   CONSTITUTIONAL: negative for  fevers, chills, sweats  EYES Negative for scleral icterus or redness, and diplopia  HEENT: Negative for hoarseness  RESPIRATORY: Negative for cough and severe shortness of breath  CARDIOVASCULAR: Negative for crushing sub-sternal chest pain  GASTROINTESTINAL: See HPI  GENITOURINARY: Negative for dysuria  MUSCULOSKELETAL: Negative for arthralgias and myalgias  SKIN: Negative for jaundice, rash or pruritus  NEUROLOGICAL: Negative for dizziness and headaches  BEHAVIOR/PSYCH: Negative for psychotic behavior      PHYSICAL EXAM:   Blood pressure 143/86, pulse 87, height 5' 1\" (1.549 m), weight 138 lb (62.6 kg), not currently breastfeeding.    GEN: Alert, no acute distress, well-nourished   HEENT: anicteric sclera, neck supple, trachea midline, MMM, no palpable or tender neck or supraclavicular lymph nodes  CV: RRR, the extremities are warm and well perfused   LUNGS: No increased work of breathing, CTAB  ABDOMEN: Soft, symmetrical, non-tender without distention or guarding. No scars or lesions. Aorta is without bruit or visible pulsation. Umbilicus is midline without herniation. Normoactive bowel sounds are present, No masses, hepatomegaly or splenomegaly noted.  MSK: No erythema, no warmth, no swelling of joints  SKIN: No jaundice, no erythema, no rashes, no lesions  HEMATOLOGIC: No bleeding, no bruising  NEURO: Alert and interactive, ANGELA  PSYCH: appropriate mood & affect    Labs/Imaging:     Patient's labs and imaging were reviewed and discussed with patient today.    .  ASSESSMENT/PLAN:   Juana Adler is a 63 year old year-old female with medical history including osteoporosis, HTN, bipolar affective, anxiety, and other conditions in table below who presents for colon cancer screening evaluation.    #BRBPR  -one episode 1 week ago she passed flatus and later when she went to the bathroom noticed she ad bright red blood in underwear. She didn't feel the blood when it came out. Otherwise denies blood in stool or when  wiping  -the past week having anal pain, hurts when sitting    #constipation  -reports feeling constipated frequently and then will notice small dots of stool in underwear, she thinks it is leaking out from rectum when she walks around. She tried not to strain during bowel movements    #dysphagia  #globus sensation  #heartburn  -reports dysphagia for 2 years, with foods or liquids. Has globus sensation at bottom of neck almost everyday when eating dinner. Better if she chews food really small and eats slow. Takes tums 2-3 x per week for heartburn. She is unable to elaborate further, says she tries to ignore it.        #high risk screening: patient has 2 extended familymembers history of colon cancer. and is considered high risk for colorectal cancer. Patient is currently asymptomatic and denies diarrhea, hematochezia, thin-stools or weight loss. We discussed risks/benefits/alternatives to procedure, including CT colonography and stool testing, they want to proceed with colonoscopy.  +normocytic anemia noted on blood work March 2024.  -maternal grandma - colon cancer, maternal aunt - colon cancer      Recommend:  For constipation and leaking stool:  -continue with probiotic  -take a fiber supplement everyday, such as metamucil, take 1-2x daily   -if still constipated THEN ADD miralax (osmotic laxative), take 3-7 days per week   -can put on zinc oxide paste (diaper cream found in baby aisle by diaper) on skin to avoid irritation     For anal pain recommendations:  -use over the counter hemorrhoid cream 2x daily for 7 days at a time (hydrocortisone cream)  -can do sitz bath during flare  -use wet wipes (baby wipes) instead of toilet paper   -avoid sitting on toilet for long periods of time and avoid straining      For trouble swallowing:  -go to lab on 1st floor for H. Pylori test  -after H. Pylori test start taking pantoprazole 40mg once a day, take on empty stomach.   -call to schedule xray esophagus  [phone]      -follow up with me 1 month after endoscopy    ----------------------------------------------------------------      Schedule EGD & colonoscopy with Dr. Gupta, Dr. Peng or Dr. Bess   Diagnosis: CRC screen, hx polyps, chronic constipation, BRBPR, dysphagia, globus sensation  Sedation: MAC  Prep: split dose golytely    Colonoscopy consent: I have discussed the risks, benefits, and alternatives to colonoscopy with the patient [who demonstrated understanding], including but not limited to the risks of bleeding, infection, pain, as well as the risks of anesthesia and perforation all leading to prolonged hospitalization, surgical intervention. I also specifically mentioned the miss rate of colonoscopy of 5-10% in the best of all circumstances. All questions were answered to the patient’s satisfaction. The patient elected to proceed with colonoscopy with intervention [i.e. polypectomy, etc.] as indicated.    Orders This Visit:  Orders Placed This Encounter   Procedures    Helicobacter Pylori Breath Test       Meds This Visit:  Requested Prescriptions     Signed Prescriptions Disp Refills    polyethylene glycol, PEG 3350-KCl-NaBcb-NaCl-NaSulf, 236 g Oral Recon Soln 1 each 0     Sig: Take 4,000 mL by mouth As Directed. Take 2,000 mL the night before your procedure and 2,000 mL the morning of your procedure. Take as directed by GI clinic. Okay to substitute for generic.    pantoprazole 40 MG Oral Tab EC 90 tablet 0     Sig: Take 1 tablet (40 mg total) by mouth every morning before breakfast.       Imaging & Referrals:  XR ESOPHAGUS SINGLE CONTRAST (CPT=[id number])       ADITI Nicole    [facility]astroenterology  3/21/2024      The dictation was partially prepared using Dragon Medical voice recognition software. As a result, errors may occur. When identified, these errors have been corrected. While every attempt is made to correct errors during dictation, discrepancies may still exist.

## 2024-03-23 LAB — H PYLORI BREATH TEST: POSITIVE

## 2024-03-25 ENCOUNTER — TELEPHONE (OUTPATIENT)
Facility: CLINIC | Age: 64
End: 2024-03-25

## 2024-03-25 DIAGNOSIS — A04.8 H. PYLORI INFECTION: ICD-10-CM

## 2024-03-25 DIAGNOSIS — A04.8 H. PYLORI INFECTION: Primary | ICD-10-CM

## 2024-03-25 RX ORDER — OMEPRAZOLE 20 MG/1
20 CAPSULE, DELAYED RELEASE ORAL
Qty: 109 CAPSULE | Refills: 0 | OUTPATIENT
Start: 2024-03-25

## 2024-03-25 RX ORDER — METRONIDAZOLE 250 MG/1
250 TABLET ORAL 4 TIMES DAILY
Qty: 56 TABLET | Refills: 0 | Status: SHIPPED | OUTPATIENT
Start: 2024-03-25 | End: 2024-04-08

## 2024-03-25 RX ORDER — TETRACYCLINE HYDROCHLORIDE 500 MG/1
500 CAPSULE ORAL 4 TIMES DAILY
Qty: 56 CAPSULE | Refills: 0 | Status: SHIPPED | OUTPATIENT
Start: 2024-03-25 | End: 2024-04-08

## 2024-03-25 RX ORDER — OMEPRAZOLE 20 MG/1
20 CAPSULE, DELAYED RELEASE ORAL
Qty: 28 CAPSULE | Refills: 0 | Status: SHIPPED | OUTPATIENT
Start: 2024-03-25 | End: 2024-04-08

## 2024-03-25 RX ORDER — BISMUTH SUBSALICYLATE 262 MG/1
2 TABLET, CHEWABLE ORAL 4 TIMES DAILY
Qty: 112 TABLET | Refills: 0 | Status: SHIPPED | OUTPATIENT
Start: 2024-03-25 | End: 2024-04-08

## 2024-03-25 NOTE — TELEPHONE ENCOUNTER
Requested Prescriptions     Pending Prescriptions Disp Refills    OMEPRAZOLE 20 MG Oral Capsule Delayed Release [Pharmacy Med Name: OMEPRAZOLE 20MG CAPSULES] 109 capsule 0     Sig: TAKE 1 CAPSULE(20 MG) BY MOUTH TWICE DAILY BEFORE MEALS FOR 14 DAYS       LOV   LR    em

## 2024-03-27 ENCOUNTER — MED REC SCAN ONLY (OUTPATIENT)
Dept: INTERNAL MEDICINE CLINIC | Facility: CLINIC | Age: 64
End: 2024-03-27

## 2024-03-28 ENCOUNTER — HOSPITAL ENCOUNTER (OUTPATIENT)
Dept: GENERAL RADIOLOGY | Facility: HOSPITAL | Age: 64
Discharge: HOME | End: 2024-03-28
Payer: COMMERCIAL

## 2024-03-28 DIAGNOSIS — R09.A2 GLOBUS SENSATION: ICD-10-CM

## 2024-03-28 DIAGNOSIS — R12 HEARTBURN: ICD-10-CM

## 2024-03-28 DIAGNOSIS — R13.19 ESOPHAGEAL DYSPHAGIA: ICD-10-CM

## 2024-03-28 PROCEDURE — 74221 X-RAY XM ESOPHAGUS 2CNTRST: CPT

## 2024-04-05 ENCOUNTER — HOSPITAL ENCOUNTER (OUTPATIENT)
Dept: MAMMOGRAPHY | Facility: HOSPITAL | Age: 64
Discharge: HOME | End: 2024-04-05
Attending: INTERNAL MEDICINE
Payer: COMMERCIAL

## 2024-04-05 DIAGNOSIS — Z12.31 SCREENING MAMMOGRAM FOR BREAST CANCER: ICD-10-CM

## 2024-04-05 PROCEDURE — 77063 BREAST TOMOSYNTHESIS BI: CPT | Performed by: INTERNAL MEDICINE

## 2024-04-05 PROCEDURE — 77067 SCR MAMMO BI INCL CAD: CPT | Performed by: INTERNAL MEDICINE

## 2024-04-15 ENCOUNTER — NURSE TRIAGE (OUTPATIENT)
Dept: INTERNAL MEDICINE CLINIC | Facility: CLINIC | Age: 64
End: 2024-04-15

## 2024-04-15 NOTE — TELEPHONE ENCOUNTER
Action Requested: Summary for Provider     []  Critical Lab, Recommendations Needed  [] Need Additional Advice  [x]   FYI    []   Need Orders  [] Need Medications Sent to Pharmacy  []  Other     SUMMARY: Patient calling today, she tripped wearing new shoes last Thursday and fell hitting her head (nose and forehead) on the cement. Patient did not LOC - denies dizzy or lightheadedness but does still have headache. Advised ER for evaluation and imaging of head. She reports she has 2 black eyes and a lump on her head.     Patient agreeable when I was talking with her to go to ER for eval but she wanted to call her sister first. Advised she should go now, ASAP for eval.     Reason for call: Head Injury  Onset: last Thursday      Reason for Disposition   Dangerous injury (e.g., MVA, diving, trampoline, contact sports, fall > 10 feet or 3 meters) or severe blow from hard object (e.g., golf club or baseball bat)    Protocols used: Head Injury-A-[place]

## 2024-04-20 DIAGNOSIS — A04.8 H. PYLORI INFECTION: ICD-10-CM

## 2024-04-23 RX ORDER — OMEPRAZOLE 20 MG/1
20 CAPSULE, DELAYED RELEASE ORAL
Qty: 28 CAPSULE | Refills: 0 | OUTPATIENT
Start: 2024-04-23

## 2024-04-23 NOTE — TELEPHONE ENCOUNTER
OFFICE VISIT      Patient: Babatunde Alfonso Date of Service: 2019   : 1963 MRN: [id number]     SUBJECTIVE:   HISTORY OF PRESENT ILLNESS:  Babatunde Alfonso is a 56 year old male who presents today for Cpx      PAST MEDICAL HISTORY:  Past Medical History:   Diagnosis Date   • High cholesterol      in past       MEDICATIONS:  No current outpatient medications on file.     No current facility-administered medications for this visit.        ALLERGIES:  ALLERGIES:  No Known Allergies    PAST SURGICAL HISTORY:  Past Surgical History:   Procedure Laterality Date   • Vasectomy         FAMILY HISTORY:  Family History   Problem Relation Age of Onset   • Diabetes Mother         DM   • Other Mother         valve dz   • Patient is unaware of any medical problems Sister    • Patient is unaware of any medical problems Brother    • Cancer, Colon Maternal Uncle    • Patient is unaware of any medical problems Brother    • Patient is unaware of any medical problems Brother        SOCIAL HISTORY:  Social History     Tobacco Use   • Smoking status: Former Smoker     Packs/day: 0.00     Last attempt to quit:      Years since quittin.9   • Smokeless tobacco: Never Used   • Tobacco comment: When did you stop smoking?:    Substance Use Topics   • Alcohol use: Yes     Frequency: 2-4 times a month     Drinks per session: 1 or 2     Binge frequency: Never     Comment: socially   • Drug use: Never       Review of Systems   Constitutional: Negative.    HENT: Negative.    Eyes: Negative.    Respiratory: Negative.    Cardiovascular: Negative.    Gastrointestinal: Negative.    Endocrine: Negative.    Genitourinary: Negative.    Musculoskeletal: Negative.    Skin: Negative.    Neurological: Negative.    Hematological: Negative.    Psychiatric/Behavioral: Negative.          OBJECTIVE:     Physical Exam   Constitutional: He is oriented to person, place, and time. He appears well-developed and well-nourished.   Eyes: Pupils are  Requested Prescriptions     Pending Prescriptions Disp Refills    OMEPRAZOLE 20 MG Oral Capsule Delayed Release [Pharmacy Med Name: OMEPRAZOLE 20MG CAPSULES] 28 capsule 0     Sig: TAKE 1 CAPSULE(20 MG) BY MOUTH TWICE DAILY BEFORE MEALS FOR 14 DAYS         LOV   3/21/2024      LR   3/25/2024      CA      RR to office on call   equal, round, and reactive to light.   Neck: Neck supple. No thyromegaly present.   Cardiovascular: Normal rate, regular rhythm and normal heart sounds.   No murmur heard.  Pulmonary/Chest: Effort normal and breath sounds normal. He has no wheezes.   Abdominal: Soft. He exhibits no distension. There is no tenderness.   Lymphadenopathy:     He has no cervical adenopathy.   Neurological: He is alert and oriented to person, place, and time.   Skin: Skin is warm and dry.   Vitals reviewed.      Visit Vitals  /84 (BP Location: RUE - Right upper extremity)   Pulse 68   Temp 98.1 °F (36.7 °C)   Resp 16   Ht 5' 11\" (1.803 m)   Wt 102.2 kg (225 lb 3.2 oz)   BMI 31.41 kg/m²         Wt Readings from Last 1 Encounters:   11/20/19 102.2 kg (225 lb 3.2 oz)          DIAGNOSTIC STUDIES:   LAB RESULTS:    Lab results reviewed    No visits with results within 1 Day(s) from this visit.   Latest known visit with results is:   Nurse Only on 11/19/2019   Component Date Value Ref Range Status   • HEPATITIS C ANTIBODY 11/19/2019 NEGATIVE  NEGATIVE Final   • PSA, Total 11/19/2019 1.03  <4.01 ng/mL Final    Comment:    SUGGESTED AGE SPECIFIC REFERENCE RANGES     AGE                NG/ML  40-49              <=2.50  50-59              <=3.50  60-69              <=4.50  70-79              <=6.50     REFERENCE: JOURNAL OF UROLOGY 155, [id number]     [employer] Chemiluminescence     • Fasting Status 11/19/2019 UNKNOWN  hrs Final   • Sodium 11/19/2019 143  135 - 145 mmol/L Final   • Potassium 11/19/2019 4.4  3.4 - 5.1 mmol/L Final   • Chloride 11/19/2019 109* 98 - 107 mmol/L Final   • Carbon Dioxide 11/19/2019 30  21 - 32 mmol/L Final   • Anion Gap 11/19/2019 8* 10 - 20 mmol/L Final   • Glucose 11/19/2019 105* 65 - 99 mg/dL Final   • BUN 11/19/2019 16  6 - 20 mg/dL Final   • Creatinine 11/19/2019 0.87  0.67 - 1.17 mg/dL Final   • GFR Estimate,  11/19/2019 >90   Final    eGFR results = or >90 mL/min/1.73m2 = Normal kidney  function.   • GFR Estimate, Non  11/19/2019 >90   Final    eGFR results = or >90 mL/min/1.73m2 = Normal kidney function.   • BUN/Creatinine Ratio 11/19/2019 18  7 - 25 Final   • CALCIUM 11/19/2019 8.8  8.4 - 10.2 mg/dL Final   • TOTAL BILIRUBIN 11/19/2019 0.8  0.2 - 1.0 mg/dL Final   • AST/SGOT 11/19/2019 23  <38 Units/L Final   • ALT/SGPT 11/19/2019 35  <64 Units/L Final   • ALK PHOSPHATASE 11/19/2019 59  45 - 117 Units/L Final   • TOTAL PROTEIN 11/19/2019 6.8  6.4 - 8.2 g/dL Final   • Albumin 11/19/2019 4.0  3.6 - 5.1 g/dL Final   • GLOBULIN 11/19/2019 2.8  2.0 - 4.0 g/dL Final   • A/G Ratio, Serum 11/19/2019 1.4  1.0 - 2.4 Final   • TSH 11/19/2019 1.265  0.350 - 5.000 mcUnits/mL Final   • FASTING STATUS 11/19/2019 UNKNOWN  hrs Final   • CHOLESTEROL 11/19/2019 207* <200 mg/dL Final    Comment:    Desirable            <200  Borderline High      200 to 239  High                 >=240        • CALCULATED LDL 11/19/2019 139* <130 mg/dL Final    Comment: OPTIMAL               <100  NEAR OPTIMAL          100-129  BORDERLINE HIGH       130-159  HIGH                  160-189  VERY HIGH             >=190     • HDL 11/19/2019 40  >39 mg/dL Final    Comment: Low            <40  Borderline Low 40 to 49  Near Optimal   50 to 59  Optimal        >=60     • TRIGLYCERIDE 11/19/2019 141  <150 mg/dL Final    Comment: Normal                   <150  Borderline High          150 to 199  High                     200 to 499  Very High                >=500     • CALCULATED NON HDL 11/19/2019 167  mg/dL Final    Comment:    Therapeutic Target:  CHD and risk equivalents <130  Multiple risk factors    <160  0 to 1 risk factors      <190        • CHOL/HDL 11/19/2019 5.2* <4.5 Final   • Hemoglobin A1C 11/19/2019 5.7* 4.5 - 5.6 % Final    Comment:    ----DIABETIC SCREENING---  NON DIABETIC                 <5.7%  INCREASED RISK                5.7-6.4%  DIAGNOSTIC FOR DIABETES      >6.4%     ----DIABETIC CONTROL---     A1C%            eAG mg/dL  6.0            126  6.5            140  7.0            154  7.5            169  8.0            183  8.5            197  9.0            212  9.5            226  10.0           240     • WBC 11/19/2019 5.3  4.2 - 11.0 K/mcL Final   • RBC 11/19/2019 5.86  4.50 - 5.90 mil/mcL Final   • HGB 11/19/2019 16.7  13.0 - 17.0 g/dL Final   • HCT 11/19/2019 51.8* 39.0 - 51.0 % Final   • MCV 11/19/2019 88.4  78.0 - 100.0 fl Final   • MCH 11/19/2019 28.5  26.0 - 34.0 pg Final   • MCHC 11/19/2019 32.2  32.0 - 36.5 g/dL Final   • RDW-CV 11/19/2019 13.1  11.0 - 15.0 % Final   • PLT 11/19/2019 195  140 - 450 K/mcL Final   • NRBC 11/19/2019 0  0 /100 WBC Final   • DIFF TYPE 11/19/2019 AUTOMATED DIFFERENTIAL   Final   • Neutrophil 11/19/2019 57  % Final   • LYMPH 11/19/2019 30  % Final   • MONO 11/19/2019 11  % Final   • EOSIN 11/19/2019 2  % Final   • BASO 11/19/2019 0  % Final   • Percent Immature Granuloctyes 11/19/2019 0  % Final   • Absolute Neutrophil 11/19/2019 3.0  1.8 - 7.7 K/mcL Final   • Absolute Lymph 11/19/2019 1.6  1.0 - 4.0 K/mcL Final   • Absolute Mono 11/19/2019 0.6  0.3 - 0.9 K/mcL Final   • Absolute Eos 11/19/2019 0.1  0.1 - 0.5 K/mcL Final   • Absolute Baso 11/19/2019 0.0  0.0 - 0.3 K/mcL Final   • Absolute Immature Granulocytes 11/19/2019 0.0  0 - 0.2 K/mcl Final     Assessment AND PLAN:     No problem-specific Assessment & Plan notes found for this encounter.        Diagnoses and all orders for this visit:  Annual physical exam  Prediabetes      Healthy lifestyle d/w pt and encouraged.  Lab results reviewed with pt.  Age appropriate preventative screenings reviewed and d/w pt.  Immunization record reviewed and updated.  Pt agreed to schedule for colonoscopy next few months.    Instructions provided as documented in the AVS.    Return in about 1 year (around 11/20/2020), or if symptoms worsen or fail to improve, for Cpx.    Babatunde Alfonso indicated understanding of the diagnosis and agreed with the  plan of care.      Josse Nesbitt MD  11/20/2019

## 2024-05-14 PROBLEM — I10 ESSENTIAL HYPERTENSION: Status: ACTIVE | Noted: 2023-12-29

## 2024-05-20 ENCOUNTER — TELEPHONE (OUTPATIENT)
Facility: CLINIC | Age: 64
End: 2024-05-20

## 2024-05-20 NOTE — TELEPHONE ENCOUNTER
Called patient - went over how to take the bowel prep the day before the procedure and advised [facility] will call tomorrow afternoon to confirm her arrival time for the Wednesday procedure.

## 2024-05-22 PROCEDURE — 88312 SPECIAL STAINS GROUP 1: CPT | Performed by: INTERNAL MEDICINE

## 2024-05-22 PROCEDURE — 88305 TISSUE EXAM BY PATHOLOGIST: CPT | Performed by: INTERNAL MEDICINE

## 2024-05-24 ENCOUNTER — TELEPHONE (OUTPATIENT)
Facility: CLINIC | Age: 64
End: 2024-05-24

## 2024-05-24 NOTE — TELEPHONE ENCOUNTER
----- Message from Tisha Gupta sent at 5/24/2024 11:55 AM CDT -----  ARCHIE Frank -    GI Staff:    Can you please place recall for this patient to have a colonoscopy in 7 years.    Thank you,  Tisha

## 2024-05-24 NOTE — TELEPHONE ENCOUNTER
Entered into Epic.     Recall colonoscopy in 7 years per Dr. Gupta.     Last colonoscopy done 5/22/2024.     Recall entered into Patient Outreach for 5/22/2031.     Health Maintenance updated.

## 2024-08-15 ENCOUNTER — HOSPITAL ENCOUNTER (OUTPATIENT)
Age: 64
Discharge: HOME | End: 2024-08-15
Payer: COMMERCIAL

## 2024-08-15 VITALS
HEART RATE: 88 BPM | SYSTOLIC BLOOD PRESSURE: 130 MMHG | OXYGEN SATURATION: 96 % | DIASTOLIC BLOOD PRESSURE: 90 MMHG | RESPIRATION RATE: 18 BRPM | TEMPERATURE: 98 F

## 2024-08-15 DIAGNOSIS — J06.9 VIRAL URI WITH COUGH: Primary | ICD-10-CM

## 2024-08-15 DIAGNOSIS — H15.103 EPISCLERITIS OF BOTH EYES: ICD-10-CM

## 2024-08-15 LAB
S PYO AG THROAT QL: NEGATIVE
SARS-COV-2 RNA RESP QL NAA+PROBE: NOT DETECTED

## 2024-08-15 PROCEDURE — U0002 COVID-19 LAB TEST NON-CDC: HCPCS | Performed by: NURSE PRACTITIONER

## 2024-08-15 PROCEDURE — 99214 OFFICE O/P EST MOD 30 MIN: CPT | Performed by: NURSE PRACTITIONER

## 2024-08-15 PROCEDURE — 87880 STREP A ASSAY W/OPTIC: CPT | Performed by: NURSE PRACTITIONER

## 2024-08-15 RX ORDER — DEXAMETHASONE SODIUM PHOSPHATE 10 MG/ML
10 INJECTION, SOLUTION INTRAMUSCULAR; INTRAVENOUS ONCE
Status: COMPLETED | OUTPATIENT
Start: 2024-08-15 | End: 2024-08-15

## 2024-08-15 RX ORDER — PREDNISOLONE ACETATE 10 MG/ML
1 SUSPENSION/ DROPS OPHTHALMIC 3 TIMES DAILY
Qty: 1 EACH | Refills: 0 | Status: SHIPPED | OUTPATIENT
Start: 2024-08-15 | End: 2024-08-22

## 2024-08-15 RX ORDER — BENZONATATE 100 MG/1
100 CAPSULE ORAL 3 TIMES DAILY PRN
Qty: 30 CAPSULE | Refills: 0 | Status: SHIPPED | OUTPATIENT
Start: 2024-08-15 | End: 2024-09-14

## 2024-08-15 NOTE — DISCHARGE INSTRUCTIONS
Please take cough medicine as prescribed.  Mucinex Insta soothe throat lozenges will help.  Follow closely with your primary and ophthalmology as discussed.

## 2024-08-15 NOTE — ED PROVIDER NOTES
Patient Seen in: [facility]      History     Chief Complaint   Patient presents with    Sore Throat     Stated Complaint: sore throat    Subjective:   This is a 64-year-old female with below stated medical history.  Presents to immediate care for sore throat and cough.  Symptoms ongoing 1 week.  She was exposed to her grandson last Saturday which was sick with similar respiratory symptoms.  Patient reports she has a history of episcleritis and feels like she is having a flare.  She is out of her steroid eyedrops.  No difficulty breathing or wheezing.  No chest pain or shortness of breath.  No fevers.  Taking NyQuil with little relief.    The history is provided by the patient.           Objective:   Past Medical History:    Anxiety    Back pain    Bipolar affective (HCC)    Episcleritis    OS    Episcleritis of both eyes    \"Epiclertis Of Bilateral Eyes\"    Headache disorder    Herpes zoster    medication    High blood pressure    Osteoporosis    Prediabetes    Unspecified essential hypertension    controlled without medications, states only elevated when she is smoking cigarettes and drinking energy drinks              Past Surgical History:   Procedure Laterality Date    Appendectomy      Appendectomy      Colonoscopy      4-5 yrs ago    Hysterectomy      Total abdom hysterectomy      Upper gi endoscopy,exam                  Social History     Socioeconomic History    Marital status:    Tobacco Use    Smoking status: Former     Current packs/day: 0.00     Average packs/day: 0.5 packs/day for 20.0 years (10.0 ttl pk-yrs)     Types: Cigarettes     Start date: 2003     Quit date: 2023     Years since quittin.7     Passive exposure: Past    Smokeless tobacco: Former     Quit date: 2016   Vaping Use    Vaping status: Never Used   Substance and Sexual Activity    Alcohol use: No     Alcohol/week: 0.0 standard drinks of alcohol    Drug use: Not Currently   Other Topics  Concern    Caffeine Concern Yes     Comment: coffee, tea, 3 cups daily   Social History Narrative    ** Merged History Encounter **          Social Determinants of Health     Financial Resource Strain: Low Risk  (3/12/2024)    Financial Resource Strain     Med Affordability: No   Food Insecurity: No Food Insecurity (3/12/2024)    Food Insecurity     Food Insecurity: Never true   Transportation Needs: No Transportation Needs (3/12/2024)    Transportation Needs     Lack of Transportation: No   Physical Activity: Not on File (7/20/2023)    Received from [facility]    Physical Activity     Physical Activity: 0   Stress: Not on File (7/20/2023)    Received from [facility]    Stress     Stress: 0   Social Connections: Not on File (7/20/2023)    Received from [facility]    Social Connections     Social Connections and Isolation: 0   Housing Stability: Low Risk  (3/12/2024)    Housing Stability     Housing Instability: No              Review of Systems   Constitutional:  Negative for chills and fever.   HENT:  Positive for sore throat. Negative for congestion, ear discharge and ear pain.    Eyes:  Positive for pain, discharge, redness and itching. Negative for photophobia and visual disturbance.   Respiratory:  Positive for cough. Negative for shortness of breath, wheezing and stridor.    Cardiovascular:  Negative for chest pain, palpitations and leg swelling.   Gastrointestinal:  Negative for abdominal pain, constipation, diarrhea, nausea and vomiting.   Genitourinary:  Negative for dysuria.   Musculoskeletal:  Negative for back pain, neck pain and neck stiffness.   Skin:  Negative for rash.   Neurological:  Negative for headaches.   Hematological:  Does not bruise/bleed easily.       Positive for stated Chief Complaint: Sore Throat    Other systems are as noted in HPI.  Constitutional and vital signs reviewed.      All other systems reviewed and negative except as noted above.    Physical Exam     ED Triage Vitals  [08/15/24 1747]   /90   Pulse 88   Resp 18   Temp 98.4 °F (36.9 °C)   Temp src Temporal   SpO2 96 %   O2 Device None (Room air)       Current Vitals:   Vital Signs  BP: 130/90  Pulse: 88  Resp: 18  Temp: 98.4 °F (36.9 °C)  Temp src: Temporal    Oxygen Therapy  SpO2: 96 %  O2 Device: None (Room air)            Physical Exam  Vitals and nursing note reviewed.   Constitutional:       General: She is not in acute distress.     Appearance: Normal appearance. She is well-developed. She is not ill-appearing, toxic-appearing or diaphoretic.   HENT:      Head: Normocephalic and atraumatic.      Right Ear: Tympanic membrane, ear canal and external ear normal. No drainage, swelling or tenderness. No middle ear effusion. Tympanic membrane is not erythematous.      Left Ear: Tympanic membrane, ear canal and external ear normal. No drainage, swelling or tenderness.  No middle ear effusion. Tympanic membrane is not erythematous.      Nose: No congestion or rhinorrhea.      Mouth/Throat:      Mouth: Mucous membranes are moist. No oral lesions.      Pharynx: Oropharynx is clear. Uvula midline. Posterior oropharyngeal erythema present. No pharyngeal swelling, oropharyngeal exudate or uvula swelling.      Tonsils: No tonsillar exudate or tonsillar abscesses. 0 on the right. 0 on the left.   Eyes:      General: Lids are normal. Vision grossly intact. Gaze aligned appropriately. No allergic shiner, visual field deficit or scleral icterus.        Right eye: No discharge.         Left eye: No discharge.      Extraocular Movements: Extraocular movements intact.      Conjunctiva/sclera:      Right eye: Right conjunctiva is injected. No chemosis, exudate or hemorrhage.     Left eye: Left conjunctiva is injected. No chemosis, exudate or hemorrhage.  Cardiovascular:      Rate and Rhythm: Normal rate and regular rhythm.      Heart sounds: Normal heart sounds. No murmur heard.  Pulmonary:      Effort: Pulmonary effort is normal. No  respiratory distress.      Breath sounds: Normal breath sounds. No stridor. No wheezing, rhonchi or rales.   Musculoskeletal:      Cervical back: Neck supple.      Right lower leg: No edema.      Left lower leg: No edema.   Skin:     General: Skin is warm and dry.      Capillary Refill: Capillary refill takes less than 2 seconds.      Findings: No rash.   Neurological:      Mental Status: She is alert and oriented to person, place, and time.   Psychiatric:         Mood and Affect: Mood normal.         Behavior: Behavior normal.               ED Course     Labs Reviewed   POCT RAPID STREP - Normal   RAPID SARS-COV-2 BY PCR - Normal             Rapid strep, rapid covid.          MDM      Vital signs stable. Patient is well-appearing and nontoxic looking. Presents to immediate care for upper respiratory symptoms and bilateral eye irritation and redness.    Differential diagnosis include but not limited to COVID, viral sinusitis, strep, episcleritis, bronchitis, conjunctivitis, influenza, other viral URI, pneumonia     Lung sounds clear bilaterally. No respiratory distress or hypoxia.  No suspicion for pneumonia.  Posterior pharynx is erythemic with no evidence of exudates or PTA.  Rapid strep is negative.  Rapid COVID is also negative.  Bilateral sclera are injected.  No evidence of exudates to suggest conjunctivitis.     Clinical impression is viral URI that is exacerbating chronic episcleritis.    Dose of Decadron given here in the clinic.    DC home.  Rx given for Tessalon Perles and refilled her steroid eyedrops.  Symptomatic and supportive OTC products discussed.  Tylenol and/or ibuprofen for pain or fever. The importance of oral hydration and close follow-up with primary provider in 1 week discussed. Reasons to seek emergent treatment reinforced. Patient verbalized understanding, and agreed with plan of care. All questions answered.                                     Medical Decision Making      Disposition and  Plan     Clinical Impression:  1. Viral URI with cough    2. Episcleritis of both eyes         Disposition:  Discharge  8/15/2024  6:16 pm    Follow-up:  Lakisha Hess MD  [address] [place] [id number] [phone]    In 1 week            Medications Prescribed:  Current Discharge Medication List        START taking these medications    Details   benzonatate 100 MG Oral Cap Take 1 capsule (100 mg total) by mouth 3 (three) times daily as needed for cough.  Qty: 30 capsule, Refills: 0      prednisoLONE 1 % Ophthalmic Suspension Place 1 drop into both eyes 3 (three) times daily for 7 days.  Qty: 1 each, Refills: 0

## 2024-08-18 ENCOUNTER — TELEPHONE (OUTPATIENT)
Dept: INTERNAL MEDICINE CLINIC | Facility: CLINIC | Age: 64
End: 2024-08-18

## 2024-08-18 DIAGNOSIS — R05.1 ACUTE COUGH: Primary | ICD-10-CM

## 2024-08-18 RX ORDER — CODEINE PHOSPHATE AND GUAIFENESIN 10; 100 MG/5ML; MG/5ML
5 SOLUTION ORAL EVERY 6 HOURS PRN
Qty: 120 ML | Refills: 0 | Status: SHIPPED | OUTPATIENT
Start: 2024-08-18 | End: 2024-10-23

## 2024-08-18 RX ORDER — ALBUTEROL SULFATE 90 UG/1
2 INHALANT RESPIRATORY (INHALATION) EVERY 6 HOURS PRN
Qty: 1 EACH | Refills: 0 | Status: SHIPPED | OUTPATIENT
Start: 2024-08-18

## 2024-08-18 NOTE — TELEPHONE ENCOUNTER
Fyi: paged by pt; complained of sore throat, coughing and eye tearing/irriation, and eyelids shut, for a week or so, seen in IC on 8/15/24, felt to have a viral URI; covid and strep test negative, given dexamethasone shot, tessalon perles and steroid eye drops.  No fevers, breathing ok, talking in long sentences and doesn't appear to be in resp distress on the phone.   I told her will gve her cough med with codeine, cautioned about drowsiness, will also give her albuterol inhaler which she said she had in the past for acute bronchitis.  I told her needs to see pcp tomorrow espcially also with her eyes still irritated inspitie of steroid eyedrops given. Pt agreed and will call pcp tomorrow. Pt told to go to ER if any worsening of symtoms.

## 2024-08-30 ENCOUNTER — HOSPITAL ENCOUNTER (OUTPATIENT)
Age: 64
Discharge: HOME | End: 2024-08-30
Payer: OTHER MISCELLANEOUS

## 2024-08-30 ENCOUNTER — APPOINTMENT (OUTPATIENT)
Dept: GENERAL RADIOLOGY | Age: 64
End: 2024-08-30
Attending: PHYSICIAN ASSISTANT
Payer: OTHER MISCELLANEOUS

## 2024-08-30 ENCOUNTER — NURSE TRIAGE (OUTPATIENT)
Dept: INTERNAL MEDICINE CLINIC | Facility: CLINIC | Age: 64
End: 2024-08-30

## 2024-08-30 VITALS
TEMPERATURE: 98 F | WEIGHT: 145 LBS | BODY MASS INDEX: 27.38 KG/M2 | HEART RATE: 70 BPM | RESPIRATION RATE: 22 BRPM | SYSTOLIC BLOOD PRESSURE: 103 MMHG | HEIGHT: 61 IN | OXYGEN SATURATION: 97 % | DIASTOLIC BLOOD PRESSURE: 66 MMHG

## 2024-08-30 DIAGNOSIS — S70.01XA CONTUSION OF RIGHT HIP, INITIAL ENCOUNTER: ICD-10-CM
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
## 2024-08-30 DIAGNOSIS — S80.01XA CONTUSION OF RIGHT KNEE, INITIAL ENCOUNTER: ICD-10-CM

## 2024-08-30 PROCEDURE — 99214 OFFICE O/P EST MOD 30 MIN: CPT

## 2024-08-30 PROCEDURE — 73560 X-RAY EXAM OF KNEE 1 OR 2: CPT | Performed by: PHYSICIAN ASSISTANT

## 2024-08-30 PROCEDURE — 96372 THER/PROPH/DIAG INJ SC/IM: CPT

## 2024-08-30 PROCEDURE — 73502 X-RAY EXAM HIP UNI 2-3 VIEWS: CPT | Performed by: PHYSICIAN ASSISTANT

## 2024-08-30 RX ORDER — KETOROLAC TROMETHAMINE 30 MG/ML
30 INJECTION, SOLUTION INTRAMUSCULAR; INTRAVENOUS ONCE
Status: COMPLETED | OUTPATIENT
Start: 2024-08-30 | End: 2024-08-30

## 2024-08-30 RX ORDER — ARIPIPRAZOLE 2 MG/1
2 TABLET ORAL DAILY
COMMUNITY

## 2024-08-30 NOTE — DISCHARGE INSTRUCTIONS
Please return to the ER/clinic if symptoms worsen. Follow-up with your PCP in 24-48 hours as needed.    Wear the lidocaine patch as given as well as the ace wrap:rest ice compression elevation.  Recommend naproxen twice daily with foodIf symptoms persist or worsen make a follow-up appointment with orthopedics for further evaluation and treatment.

## 2024-08-30 NOTE — TELEPHONE ENCOUNTER
Action Requested: Summary for Provider     []  Critical Lab, Recommendations Needed  [] Need Additional Advice  [x]   FYI    []   Need Orders  [] Need Medications Sent to Pharmacy  []  Other     SUMMARY: Fall at work, right knee impact and twisted back. Now right-sided lower back pain that radiates down left buttocks and right thigh; weakness of right leg present. Patient will go to [facility] now. [See below for more details]     Reason for call: Fall and Low Back Pain  Onset: 8/24/24    Reason for Disposition   Weakness of a leg or foot (e.g., unable to bear weight, dragging foot)    Protocols used: Back Injury-A-OH      Patient called states she had a fall at work, she was walking up steps and tripped and fell forward, she did not hit her head. She skinned her right knee; she also twisted her waist and now she has right sided lower back pain and right buttocks pain that radiates to right thigh--> now pain is 10/10. She feels unsteady, she cannot bear weight on right leg. The next day after the fall she noticed that she had pain [above mentioned]. Denies any paresthesias. Denies any fever, loss of bowel or bladder control. She has been applying heat and ice at site. She sleeps on her side, she will use pillow in between knees. Home Care Advice discussed, per protocol. Refer to system/assessment yes/no answers. She was advised Immediate Care or Emergency Department --> she is electing to go to [facility] now. She also asked to schedule a follow-up Immediate Care visit with PCP --> scheduled. Patient verbalized understanding. No further questions or concerns at this time.    Future Appointments   Date Time Provider Department Center   9/4/2024  9:40 AM [facility] [place]

## 2024-08-30 NOTE — ED INITIAL ASSESSMENT (HPI)
WORKER'S COMP INITIAL INJURY    >> CHIEF COMPLAINT: right low back pain , right leg pain             DATE OF INJURY:  08/27/2024                               TIME:  8 pm                                 JOB:  food runner                       COMPANY: [employer]                              MEDS:  >> NOTES:    Right  low back pain  and right leg pain . Pt states she  tripped walking the  carpeted stairs  fell forward and twisted her body in the process. Denies head injury or LOC.   Pain radiates from hip to  thigh

## 2024-08-30 NOTE — ED PROVIDER NOTES
Patient Seen in: [facility]      History     Chief Complaint   Patient presents with    Back Pain    Worker's Comp     Stated Complaint: right side leg back pain    Subjective:   HPI    64-year-old female here with complaint of having slipped at work on either Monday or Tuesday she cannot remember injuring her right knee and right hip.  Patient states that she tried to go to work on Wednesday but has gotten progressively worse that she wants to be checked out.  Patient reports pain with sitting and ambulation.  Patient denies any further injury trauma or LOC.  Patient denies chest pain, shortness of breath, cough, abdominal pain, nausea, vomiting or diarrhea.  Patient denies muscle weakness or neurodeficits.  Patient denies striking her head.  Afebrile    Objective:   Past Medical History:    Anxiety    Back pain    Bipolar affective (HCC)    Episcleritis    OS    Episcleritis of both eyes    \"Epiclertis Of Bilateral Eyes\"    Headache disorder    Herpes zoster    medication    High blood pressure    Osteoporosis    Prediabetes    Unspecified essential hypertension    controlled without medications, states only elevated when she is smoking cigarettes and drinking energy drinks              Past Surgical History:   Procedure Laterality Date    Appendectomy      Appendectomy      Colonoscopy      4-5 yrs ago    Hysterectomy      Total abdom hysterectomy      Upper gi endoscopy,exam                  The patient's medication list, past medical history and social history elements  as listed in today's nurse's notes are reviewed and agree.   The patient's family history is reviewed and is noncontributory to the presenting problem, except as indicated as above.   Social History     Socioeconomic History    Marital status:    Tobacco Use    Smoking status: Former     Current packs/day: 0.00     Average packs/day: 0.5 packs/day for 20.0 years (10.0 ttl pk-yrs)     Types: Cigarettes     Start date:  2003     Quit date: 2023     Years since quittin.7     Passive exposure: Past    Smokeless tobacco: Former     Quit date: 2016   Vaping Use    Vaping status: Never Used   Substance and Sexual Activity    Alcohol use: No     Alcohol/week: 0.0 standard drinks of alcohol    Drug use: Not Currently   Other Topics Concern    Caffeine Concern Yes     Comment: coffee, tea, 3 cups daily   Social History Narrative    ** Merged History Encounter **          Social Determinants of Health     Financial Resource Strain: Low Risk  (3/12/2024)    Financial Resource Strain     Med Affordability: No   Food Insecurity: No Food Insecurity (3/12/2024)    Food Insecurity     Food Insecurity: Never true   Transportation Needs: No Transportation Needs (3/12/2024)    Transportation Needs     Lack of Transportation: No   Physical Activity: Not on File (2023)    Received from [facility]    Physical Activity     Physical Activity: 0   Stress: Not on File (2023)    Received from [facility]    Stress     Stress: 0   Social Connections: Not on File (2023)    Received from [facility]    Social Connections     Social Connections and Isolation: 0   Housing Stability: Low Risk  (3/12/2024)    Housing Stability     Housing Instability: No              Review of Systems    Positive for stated Chief Complaint: Back Pain and Worker's Comp    Other systems are as noted in HPI.  Constitutional and vital signs reviewed.      All other systems reviewed and negative except as noted above.    Physical Exam     ED Triage Vitals   BP 24 1427 103/66   Pulse 24 1427 70   Resp 24 1427 22   Temp 24 1427 98 °F (36.7 °C)   Temp src 24 1427 Temporal   SpO2 24 1445 97 %   O2 Device 24 1445 None (Room air)       Current Vitals:   Vital Signs  BP: 103/66  Pulse: 70  Resp: 22  Temp: 98 °F (36.7 °C)  Temp src: Temporal    Oxygen Therapy  SpO2: 97 %  O2 Device: None (Room  air)            Physical Exam  Vitals and nursing note reviewed.   Constitutional:       Appearance: Normal appearance. She is well-developed.   HENT:      Head: Normocephalic.      Right Ear: External ear normal.      Left Ear: External ear normal.      Nose: Nose normal.      Mouth/Throat:      Mouth: Mucous membranes are moist.   Eyes:      Conjunctiva/sclera: Conjunctivae normal.      Pupils: Pupils are equal, round, and reactive to light.   Cardiovascular:      Rate and Rhythm: Normal rate and regular rhythm.      Heart sounds: Normal heart sounds.   Pulmonary:      Effort: Pulmonary effort is normal.      Breath sounds: Normal breath sounds.   Musculoskeletal:      Cervical back: Normal range of motion and neck supple.      Right hip: Tenderness and bony tenderness present.      Left hip: Normal.      Right upper leg: Normal.      Left upper leg: Normal.      Right knee: Bony tenderness present. Tenderness present.      Left knee: Normal.      Right ankle: Normal.      Left ankle: Normal.      Comments: RLE: N/V intact, strength 5/5   Skin:     General: Skin is warm.      Capillary Refill: Capillary refill takes less than 2 seconds.   Neurological:      General: No focal deficit present.      Mental Status: She is alert and oriented to person, place, and time.   Psychiatric:         Mood and Affect: Mood normal.         Behavior: Behavior normal.         Thought Content: Thought content normal.         Judgment: Judgment normal.             ED Course   I personally reviewed the xray images and and saw these findings: no acute findings     XR KNEE (1 OR 2 VIEWS), RIGHT (CPT=73560)    Result Date: 8/30/2024  PROCEDURE:  XR KNEE (1 OR 2 VIEWS), RIGHT (CPT=73560)  COMPARISON:  None.  INDICATIONS:  right side leg back pain  PATIENT STATED HISTORY: (As transcribed by Technologist)  Patient states that she fell 8/26/2024 and injured her right knee. Patient states that she has difficulty walking and laying down for  long periods of time on her back and right side.   FINDINGS:  Negative for fracture, dislocation, deformity or other acute bony abnormalities.  No soft tissue abnormalities.             CONCLUSION:  No acute fracture or other acute bony process. LOCATION:  [id number]   Dictated by ([facility]): Ander Singh MD on 8/30/2024 at 3:23 PM     Finalized by ([facility]): Ander Singh MD on 8/30/2024 at 3:24 PM       XR HIP W OR WO PELVIS 2 OR 3 VIEWS, RIGHT (CPT=73502)    Result Date: 8/30/2024  PROCEDURE:  XR HIP W OR WO PELVIS 2 OR 3 VIEWS, RIGHT ( CPT=73502)  TECHNIQUE:  Unilateral 2 to 3 views of the hip and pelvis if performed.  COMPARISON:  None.  INDICATIONS:  right side leg back pain.     Pain involving the extremity, after injury.    PATIENT STATED HISTORY: (As transcribed by Technologist)  Patient states that she fell 8/26/2024 and injured her right hip. Patient states that she has difficulty walking and laying down for long periods of time on her back and right side.    FINDINGS:  Negative for fracture, dislocation, deformity or other acute bony abnormality. Osteoarthritic changes are noted, minimal degree.  No soft tissue abnormalities.             CONCLUSION:  Minimal osteoarthritic changes are present. No acute fracture or other acute bony process.   LOCATION:  [id number]   Dictated by ([facility]): Ander Singh MD on 8/30/2024 at 3:22 PM     Finalized by ([facility]): Ander Singh MD on 8/30/2024 at 3:23 PM            Site:R knee  Device:ace wrap  N/V intact: Yes              [facility]         Clinical Impression: R hip/R knee  Course of Treatment:   Wear the lidocaine patch as given as well as the ace wrap:rest ice compression elevation.  Recommend naproxen twice daily with foodIf symptoms persist or worsen make a follow-up appointment with orthopedics for further evaluation and treatment.    The patient is encouraged to return if any concerning symptoms arise. Additional verbal discharge instructions are given and discussed. Discharge  medications are discussed. The patient is in good condition throughout the visit today and remains so upon discharge. I discuss the plan of care with the patient, who expresses understanding. All questions and concerns are addressed to the patient's satisfaction prior to discharge today.  Previous conversations with PCP and charts were reviewed.                                     Disposition and Plan     Clinical Impression:  1. Fall, initial encounter    2. Contusion of right hip, initial encounter    3. Contusion of right knee, initial encounter         Disposition:  Discharge  8/30/2024  3:40 pm    Follow-up:  Lakisha Hess MD  [address] [place] [address]  [phone]          Boo Morgan MD  [address] [address]  [phone]                Medications Prescribed:  Current Discharge Medication List

## 2024-09-17 ENCOUNTER — TELEPHONE (OUTPATIENT)
Dept: INTERNAL MEDICINE CLINIC | Facility: CLINIC | Age: 64
End: 2024-09-17

## 2024-09-17 NOTE — TELEPHONE ENCOUNTER
Patient calling,verified name and date of birth.   Patient calling to schedule appointment  this Saturday for sciatica. Declines triage.  Future Appointments   Date Time Provider Department Center   9/21/2024  9:40 AM Maggy Balbuena, APRN [id number] [place]

## 2024-10-02 ENCOUNTER — TELEPHONE (OUTPATIENT)
Dept: INTERNAL MEDICINE CLINIC | Facility: CLINIC | Age: 64
End: 2024-10-02

## 2024-10-02 ENCOUNTER — OFFICE VISIT (OUTPATIENT)
Dept: INTERNAL MEDICINE CLINIC | Facility: CLINIC | Age: 64
End: 2024-10-02

## 2024-10-02 ENCOUNTER — TELEPHONE (OUTPATIENT)
Dept: PHYSICAL THERAPY | Facility: HOSPITAL | Age: 64
End: 2024-10-02

## 2024-10-02 VITALS
RESPIRATION RATE: 18 BRPM | WEIGHT: 146 LBS | HEIGHT: 61 IN | SYSTOLIC BLOOD PRESSURE: 106 MMHG | DIASTOLIC BLOOD PRESSURE: 70 MMHG | HEART RATE: 66 BPM | BODY MASS INDEX: 27.56 KG/M2

## 2024-10-02 DIAGNOSIS — M54.31 SCIATICA, RIGHT SIDE: Primary | ICD-10-CM

## 2024-10-02 PROCEDURE — 3008F BODY MASS INDEX DOCD: CPT | Performed by: INTERNAL MEDICINE

## 2024-10-02 PROCEDURE — 99213 OFFICE O/P EST LOW 20 MIN: CPT | Performed by: INTERNAL MEDICINE

## 2024-10-02 PROCEDURE — 3078F DIAST BP <80 MM HG: CPT | Performed by: INTERNAL MEDICINE

## 2024-10-02 PROCEDURE — 3074F SYST BP LT 130 MM HG: CPT | Performed by: INTERNAL MEDICINE

## 2024-10-02 RX ORDER — LIDOCAINE 50 MG/G
PATCH TOPICAL
Qty: 30 PATCH | Refills: 1 | Status: SHIPPED | OUTPATIENT
Start: 2024-10-02

## 2024-10-02 RX ORDER — TRAMADOL HYDROCHLORIDE 50 MG/1
50 TABLET ORAL EVERY 6 HOURS PRN
Qty: 30 TABLET | Refills: 0 | Status: SHIPPED | OUTPATIENT
Start: 2024-10-02

## 2024-10-02 RX ORDER — PHENOL 1.4 %
1 AEROSOL, SPRAY (ML) MUCOUS MEMBRANE DAILY
COMMUNITY

## 2024-10-02 RX ORDER — PREDNISONE 10 MG/1
TABLET ORAL
Qty: 30 TABLET | Refills: 0 | Status: SHIPPED | OUTPATIENT
Start: 2024-10-02

## 2024-10-02 NOTE — PROGRESS NOTES
Juana Adler is a 64 year old female.  Chief Complaint   Patient presents with    Acute     Right sciatic pain down to her foot     HPI:    Patient presented for R hip pain. She states that the pain has been ongoing since her fall in August. Pain feels like an electric shock that radiates down her R leg. She usually takes tylenol as needed. But it is not helping her much. Daily activities are being effected.   No numbness, tingling, no red flag signs.  Current Outpatient Medications   Medication Sig Dispense Refill    predniSONE 10 MG Oral Tab 4 tabs daily for 3 days, then 3 tabs daily for 3 days, then 2 tabs daily for 3 days, 1 tab daily for 3 days. 30 tablet 0    Multiple Vitamins-Minerals (MULTIVITAMIN ADULTS 50+) Oral Tab Take 1 tablet by mouth daily.      traMADol 50 MG Oral Tab Take 1 tablet (50 mg total) by mouth every 6 (six) hours as needed for Pain. 30 tablet 0    lidocaine 5 % External Patch APPLY 1 PATCH TO AFFECTED AREA DAILY AS NEEDED FOR 5-7 DAYS. PURCHASE OVER THE COUNTER IF NOT COVERED BY INSURANCE. 30 patch 1    ARIPiprazole (ABILIFY) 2 MG Oral Tab Take 1 tablet (2 mg total) by mouth daily.      guaiFENesin-codeine (CHERATUSSIN AC) 100-10 MG/5ML Oral Solution Take 5 mL by mouth every 6 (six) hours as needed for cough. (Patient not taking: Reported on 10/2/2024) 120 mL 0    albuterol (PROAIR HFA) 108 (90 Base) MCG/ACT Inhalation Aero Soln Inhale 2 puffs into the lungs every 6 (six) hours as needed for Wheezing or Shortness of Breath. (Patient not taking: Reported on 10/2/2024) 1 each 0    propranolol 10 MG Oral Tab Take 1 tablet (10 mg total) by mouth 2 (two) times daily. For tremors (Patient not taking: Reported on 8/30/2024) 60 tablet 0    lurasidone (LATUDA) 80 MG Oral Tab Take 1 tablet (80 mg total) by mouth daily with breakfast. (Patient not taking: Reported on 8/30/2024) 30 tablet 3    polyethylene glycol, PEG 3350-KCl-NaBcb-NaCl-NaSulf, 236 g Oral Recon Soln Take 4,000 mL by mouth As  Directed. Take 2,000 mL the night before your procedure and 2,000 mL the morning of your procedure. Take as directed by GI clinic. Okay to substitute for generic. (Patient not taking: Reported on 10/2/2024) 1 each 0    alendronate 70 MG Oral Tab Take 1 tablet (70 mg total) by mouth once a week. Pt takes medication on  (Patient not taking: Reported on 10/2/2024)        Past Medical History:    Anxiety    Back pain    Bipolar affective (HCC)    Episcleritis    OS    Episcleritis of both eyes    \"Epiclertis Of Bilateral Eyes\"    Headache disorder    Herpes zoster    medication    High blood pressure    Osteoporosis    Prediabetes    Unspecified essential hypertension    controlled without medications, states only elevated when she is smoking cigarettes and drinking energy drinks      Past Surgical History:   Procedure Laterality Date    Appendectomy      Appendectomy      Colonoscopy      4-5 yrs ago    Hysterectomy      Total abdom hysterectomy      Upper gi endoscopy,exam        Social History:  Social History     Socioeconomic History    Marital status:    Tobacco Use    Smoking status: Former     Current packs/day: 0.00     Average packs/day: 0.5 packs/day for 20.0 years (10.0 ttl pk-yrs)     Types: Cigarettes     Start date: 2003     Quit date: 2023     Years since quittin.8     Passive exposure: Past    Smokeless tobacco: Former     Quit date: 2016   Vaping Use    Vaping status: Never Used   Substance and Sexual Activity    Alcohol use: No     Alcohol/week: 0.0 standard drinks of alcohol    Drug use: Not Currently   Other Topics Concern    Caffeine Concern Yes     Comment: coffee, tea, 3 cups daily   Social History Narrative    ** Merged History Encounter **          Social Drivers of Health     Financial Resource Strain: Low Risk  (3/12/2024)    Financial Resource Strain     Med Affordability: No   Food Insecurity: Not on File (2024)    Received from [employer]  Insecurity     Food: 0   Transportation Needs: No Transportation Needs (3/12/2024)    Transportation Needs     Lack of Transportation: No   Physical Activity: Not on File (7/20/2023)    Received from [facility]    Physical Activity     Physical Activity: 0   Stress: Not on File (7/20/2023)    Received from [facility]    Stress     Stress: 0   Social Connections: Not on File (9/15/2024)    Received from [employer]    Social Connections     Connectedness: 0   Housing Stability: Low Risk  (3/12/2024)    Housing Stability     Housing Instability: No      Family History   Problem Relation Age of Onset    Alcohol and Other Disorders Associated Mother     Pancreatic Cancer Mother     Heart Disease Father     Arthritis Father     Colon Cancer Maternal Grandmother     Colon Cancer Maternal Aunt     Breast Cancer Paternal Aunt 40    Cancer Other         pancreatic, family hx    Colon Cancer Other         family hx    Glaucoma Neg     Macular degeneration Neg     Diabetes Neg       No Known Allergies     REVIEW OF SYSTEMS:   Review of Systems   Review of Systems   Constitutional: Negative for activity change, appetite change and fever.   HENT: Negative for congestion and voice change.    Respiratory: Negative for cough and shortness of breath.    Cardiovascular: Negative for chest pain.   Gastrointestinal: Negative for abdominal distention, abdominal pain and vomiting.   Genitourinary: Negative for hematuria.   Skin: Negative for wound.   Psychiatric/Behavioral: Negative for behavioral problems.   Wt Readings from Last 5 Encounters:   10/02/24 146 lb (66.2 kg)   08/30/24 145 lb (65.8 kg)   05/14/24 135 lb (61.2 kg)   03/21/24 138 lb (62.6 kg)   02/13/24 134 lb (60.8 kg)     Body mass index is 27.59 kg/m².      EXAM:   /70   Pulse 66   Resp 18   Ht 5' 1\" (1.549 m)   Wt 146 lb (66.2 kg)   BMI 27.59 kg/m²   Physical Exam   Constitutional:       Appearance: Normal appearance.   HENT:      Head: Normocephalic.   Eyes:       Conjunctiva/sclera: Conjunctivae normal.   Cardiovascular:      Rate and Rhythm: Normal rate and regular rhythm.      Heart sounds: Normal heart sounds. No murmur heard.  Pulmonary:      Effort: Pulmonary effort is normal.      Breath sounds: Normal breath sounds. No rhonchi or rales.   Abdominal:      General: Bowel sounds are normal.      Palpations: Abdomen is soft.      Tenderness: There is no abdominal tenderness.   Musculoskeletal:      Cervical back: Neck supple.      Right lower leg: No edema.      Left lower leg: No edema.   Skin:     General: Skin is warm and dry.   Neurological:      General: No focal deficit present.      Mental Status: He is alert and oriented to person, place, and time. Mental status is at baseline.   Psychiatric:         Mood and Affect: Mood normal.         Behavior: Behavior normal.       ASSESSMENT AND PLAN:   1. Sciatica, right side  - lidocaine patch as needed to the effected area  - prednisone taper  - avoid heavy lifting   - PHYSICAL THERAPY - INTERNAL  - traMADol 50 MG Oral Tab; Take 1 tablet (50 mg total) by mouth every 6 (six) hours as needed for Pain.  Dispense: 30 tablet; Refill: 0      The patient indicates understanding of these issues and agrees to the plan.      Connie Joe MD

## 2024-10-23 ENCOUNTER — APPOINTMENT (OUTPATIENT)
Dept: CT IMAGING | Facility: HOSPITAL | Age: 64
End: 2024-10-23
Attending: NURSE PRACTITIONER
Payer: COMMERCIAL

## 2024-10-23 ENCOUNTER — APPOINTMENT (OUTPATIENT)
Dept: GENERAL RADIOLOGY | Facility: HOSPITAL | Age: 64
End: 2024-10-23
Attending: NURSE PRACTITIONER
Payer: COMMERCIAL

## 2024-10-23 ENCOUNTER — OFFICE VISIT (OUTPATIENT)
Dept: INTERNAL MEDICINE CLINIC | Facility: CLINIC | Age: 64
End: 2024-10-23

## 2024-10-23 ENCOUNTER — TELEPHONE (OUTPATIENT)
Dept: INTERNAL MEDICINE CLINIC | Facility: CLINIC | Age: 64
End: 2024-10-23

## 2024-10-23 ENCOUNTER — HOSPITAL ENCOUNTER (EMERGENCY)
Facility: HOSPITAL | Age: 64
Discharge: HOME | End: 2024-10-23
Payer: COMMERCIAL

## 2024-10-23 VITALS
HEART RATE: 71 BPM | OXYGEN SATURATION: 100 % | RESPIRATION RATE: 20 BRPM | HEIGHT: 61 IN | TEMPERATURE: 98 F | BODY MASS INDEX: 27.38 KG/M2 | DIASTOLIC BLOOD PRESSURE: 106 MMHG | WEIGHT: 145 LBS | SYSTOLIC BLOOD PRESSURE: 158 MMHG

## 2024-10-23 VITALS
HEIGHT: 61 IN | OXYGEN SATURATION: 99 % | BODY MASS INDEX: 28 KG/M2 | HEART RATE: 62 BPM | DIASTOLIC BLOOD PRESSURE: 76 MMHG | SYSTOLIC BLOOD PRESSURE: 174 MMHG

## 2024-10-23 DIAGNOSIS — I10 ESSENTIAL HYPERTENSION: ICD-10-CM

## 2024-10-23 DIAGNOSIS — R51.9 ACUTE NONINTRACTABLE HEADACHE, UNSPECIFIED HEADACHE TYPE: ICD-10-CM

## 2024-10-23 DIAGNOSIS — M54.16 LUMBAR RADICULOPATHY: ICD-10-CM

## 2024-10-23 DIAGNOSIS — M54.12 CERVICAL RADICULOPATHY: Primary | ICD-10-CM

## 2024-10-23 DIAGNOSIS — M54.9 MODERATE BACK PAIN: Primary | ICD-10-CM

## 2024-10-23 DIAGNOSIS — R10.31 RIGHT LOWER QUADRANT ABDOMINAL PAIN: ICD-10-CM

## 2024-10-23 LAB
ALBUMIN SERPL-MCNC: 4.9 G/DL (ref 3.2–4.8)
ALBUMIN/GLOB SERPL: 1.9 {RATIO} (ref 1–2)
ALP LIVER SERPL-CCNC: 94 U/L
ALT SERPL-CCNC: 31 U/L
ANION GAP SERPL CALC-SCNC: 10 MMOL/L (ref 0–18)
AST SERPL-CCNC: 18 U/L (ref ?–34)
BASOPHILS # BLD AUTO: 0.03 X10(3) UL (ref 0–0.2)
BASOPHILS NFR BLD AUTO: 0.4 %
BILIRUB SERPL-MCNC: 0.5 MG/DL (ref 0.2–1.1)
BUN BLD-MCNC: 13 MG/DL (ref 9–23)
BUN/CREAT SERPL: 18.1 (ref 10–20)
CALCIUM BLD-MCNC: 10.1 MG/DL (ref 8.7–10.4)
CHLORIDE SERPL-SCNC: 112 MMOL/L (ref 98–112)
CO2 SERPL-SCNC: 23 MMOL/L (ref 21–32)
CREAT BLD-MCNC: 0.72 MG/DL
DEPRECATED RDW RBC AUTO: 40.6 FL (ref 35.1–46.3)
EGFRCR SERPLBLD CKD-EPI 2021: 94 ML/MIN/1.73M2 (ref 60–?)
EOSINOPHIL # BLD AUTO: 0.06 X10(3) UL (ref 0–0.7)
EOSINOPHIL NFR BLD AUTO: 0.8 %
ERYTHROCYTE [DISTWIDTH] IN BLOOD BY AUTOMATED COUNT: 13.2 % (ref 11–15)
GLOBULIN PLAS-MCNC: 2.6 G/DL (ref 2–3.5)
GLUCOSE BLD-MCNC: 107 MG/DL (ref 70–99)
HCT VFR BLD AUTO: 37.4 %
HGB BLD-MCNC: 12.6 G/DL
IMM GRANULOCYTES # BLD AUTO: 0.02 X10(3) UL (ref 0–1)
IMM GRANULOCYTES NFR BLD: 0.3 %
LIPASE SERPL-CCNC: 49 U/L (ref 12–53)
LYMPHOCYTES # BLD AUTO: 2.78 X10(3) UL (ref 1–4)
LYMPHOCYTES NFR BLD AUTO: 37 %
MCH RBC QN AUTO: 28.2 PG (ref 26–34)
MCHC RBC AUTO-ENTMCNC: 33.7 G/DL (ref 31–37)
MCV RBC AUTO: 83.7 FL
MONOCYTES # BLD AUTO: 0.41 X10(3) UL (ref 0.1–1)
MONOCYTES NFR BLD AUTO: 5.5 %
NEUTROPHILS # BLD AUTO: 4.22 X10 (3) UL (ref 1.5–7.7)
NEUTROPHILS # BLD AUTO: 4.22 X10(3) UL (ref 1.5–7.7)
NEUTROPHILS NFR BLD AUTO: 56 %
OSMOLALITY SERPL CALC.SUM OF ELEC: 301 MOSM/KG (ref 275–295)
PLATELET # BLD AUTO: 460 10(3)UL (ref 150–450)
POTASSIUM SERPL-SCNC: 3.7 MMOL/L (ref 3.5–5.1)
PROT SERPL-MCNC: 7.5 G/DL (ref 5.7–8.2)
RBC # BLD AUTO: 4.47 X10(6)UL
SODIUM SERPL-SCNC: 145 MMOL/L (ref 136–145)
WBC # BLD AUTO: 7.5 X10(3) UL (ref 4–11)

## 2024-10-23 PROCEDURE — 3078F DIAST BP <80 MM HG: CPT | Performed by: INTERNAL MEDICINE

## 2024-10-23 PROCEDURE — 3077F SYST BP >= 140 MM HG: CPT | Performed by: INTERNAL MEDICINE

## 2024-10-23 PROCEDURE — 73130 X-RAY EXAM OF HAND: CPT | Performed by: NURSE PRACTITIONER

## 2024-10-23 PROCEDURE — 72125 CT NECK SPINE W/O DYE: CPT | Performed by: NURSE PRACTITIONER

## 2024-10-23 PROCEDURE — 83690 ASSAY OF LIPASE: CPT | Performed by: NURSE PRACTITIONER

## 2024-10-23 PROCEDURE — 80053 COMPREHEN METABOLIC PANEL: CPT | Performed by: NURSE PRACTITIONER

## 2024-10-23 PROCEDURE — 72100 X-RAY EXAM L-S SPINE 2/3 VWS: CPT | Performed by: NURSE PRACTITIONER

## 2024-10-23 PROCEDURE — 99284 EMERGENCY DEPT VISIT MOD MDM: CPT

## 2024-10-23 PROCEDURE — 85025 COMPLETE CBC W/AUTO DIFF WBC: CPT | Performed by: NURSE PRACTITIONER

## 2024-10-23 PROCEDURE — 70450 CT HEAD/BRAIN W/O DYE: CPT | Performed by: NURSE PRACTITIONER

## 2024-10-23 PROCEDURE — 99214 OFFICE O/P EST MOD 30 MIN: CPT | Performed by: INTERNAL MEDICINE

## 2024-10-23 PROCEDURE — 36415 COLL VENOUS BLD VENIPUNCTURE: CPT

## 2024-10-23 NOTE — TELEPHONE ENCOUNTER
After talking with Dr Cody directly-    Clinical staff onsite added the patient for an appt today.     Future Appointments   Date Time Provider Department Center   10/23/2024 11:40 AM Shanna Cody MD [id number] [place]

## 2024-10-23 NOTE — ED QUICK NOTES
Patient reports having no money/uber marg[employer]. CM called and ride provided. Patient requesting to be brought to location of car.

## 2024-10-23 NOTE — TELEPHONE ENCOUNTER
Dr Cody, please advise    Can you add an appt today for patient? Patient is asking to see you only.     Patient (name and  verified) calling with increase in sciatica pain   Patient was seen 10/2/24 and was prescribed prednisone and tramadol for sciatica. Patient c/o right side of her abd pain and the pain travels down to her right side of her body and travels down her right buttocks and right leg. Patient said she had similar symptoms in July. Patient c/o difficulty walking and states she needs to return to work tomorrow. Patient is requesting an office appt with PCP only.

## 2024-10-23 NOTE — CM/SW NOTE
Patient has no money and no one to pick her up post discharged from ER.    [facility] arranged to take patient to:    [facility]  [address] [place] [address]    Patient's car is in the parking lot at above address.

## 2024-10-23 NOTE — TELEPHONE ENCOUNTER
She called back she was crying after ER visit they did not do MRI they did not give her anything and she was discharged home patient states that she feels unsteady she is afraid that she is going to fall she is in so much pain.  I told her to go back to the ER since she is in so much pain and please contact her tomorrow she wants referral for MRI

## 2024-10-23 NOTE — ED PROVIDER NOTES
Patient Seen in: [facility] [place] Emergency Department      History     Chief Complaint   Patient presents with    Back Pain     Stated Complaint:     Subjective:   65yo/f w hx of biopolar disorder, osteoporosis, lumbar radiculopathy reports with multiple complaints. She was sent from Dr. Costello office for ?back pain and ?htn. Patient reports she had a fall a while ago at work and was advised she \"needed surgery\" but had injections since. She reports she wants an MRI of her back so she can walk. She reports pain radiating to right thigh, up her entire abdomen and neck/head. Reports pain to neck and head along with back pain after \"pushing myself at work too much\" for \"months\". No loss or retention of bowel/bladder. No weakness. No chest pain. No vomiting. No fever. She is a difficult historian, referring to her workmans comp and wanting to make calls for her workmans comp. Many of her symptoms are months in duration and varying in location and intensity and returns to \"they (Dr. Cody's) for an MRI\". \"I need a work note\".               Objective:     Past Medical History:    Anxiety    Back pain    Bipolar affective (HCC)    Episcleritis    OS    Episcleritis of both eyes    \"Epiclertis Of Bilateral Eyes\"    Headache disorder    Herpes zoster    medication    High blood pressure    Osteoporosis    Prediabetes    Unspecified essential hypertension    controlled without medications, states only elevated when she is smoking cigarettes and drinking energy drinks              Past Surgical History:   Procedure Laterality Date    Appendectomy      Appendectomy      Colonoscopy      4-5 yrs ago    Hysterectomy      Total abdom hysterectomy      Upper gi endoscopy,exam                  Social History     Socioeconomic History    Marital status:    Tobacco Use    Smoking status: Former     Current packs/day: 0.00     Average packs/day: 0.5 packs/day for 20.0 years (10.0 ttl pk-yrs)     Types: Cigarettes      Start date: 2003     Quit date: 2023     Years since quittin.8     Passive exposure: Past    Smokeless tobacco: Former     Quit date: 2016   Vaping Use    Vaping status: Never Used   Substance and Sexual Activity    Alcohol use: No     Alcohol/week: 0.0 standard drinks of alcohol    Drug use: Not Currently   Other Topics Concern    Caffeine Concern Yes     Comment: coffee, tea, 3 cups daily   Social History Narrative    ** Merged History Encounter **          Social Drivers of Health     Financial Resource Strain: Low Risk  (3/12/2024)    Financial Resource Strain     Med Affordability: No   Food Insecurity: Not on File (2024)    Received from [facility]    Food Insecurity     Food: 0   Transportation Needs: No Transportation Needs (3/12/2024)    Transportation Needs     Lack of Transportation: No   Physical Activity: Not on File (2023)    [facility] from [facility]    Physical Activity     Physical Activity: 0   Stress: Not on File (2023)    Received from [facility]    Stress     Stress: 0   Social Connections: Not on File (9/15/2024)    Received from [facility]    Social Connections     Connectedness: 0   Housing Stability: Low Risk  (3/12/2024)    Housing Stability     Housing Instability: No                  Physical Exam     ED Triage Vitals [10/23/24 1211]   /76   Pulse 64   Resp 18   Temp 97.7 °F (36.5 °C)   Temp src Temporal   SpO2 100 %   O2 Device None (Room air)       Current Vitals:   Vital Signs  BP: (!) 154/108  Pulse: 75  Resp: 18  Temp: 97.7 °F (36.5 °C)  Temp src: Temporal  MAP (mmHg): (!) 122    Oxygen Therapy  SpO2: 99 %  O2 Device: None (Room air)        Physical Exam  Vitals and nursing note reviewed.   Constitutional:       General: She is not in acute distress.     Appearance: She is well-developed.   HENT:      Head: Normocephalic and atraumatic.      Nose: Nose normal.      Mouth/Throat:      Mouth: Mucous membranes are moist.   Eyes:      Conjunctiva/sclera:  Conjunctivae normal.      Pupils: Pupils are equal, round, and reactive to light.   Cardiovascular:      Rate and Rhythm: Normal rate and regular rhythm.      Heart sounds: Normal heart sounds.   Pulmonary:      Effort: Pulmonary effort is normal.      Breath sounds: Normal breath sounds.   Abdominal:      General: Bowel sounds are normal.      Palpations: Abdomen is soft.   Musculoskeletal:         General: No tenderness or deformity. Normal range of motion.      Cervical back: Normal range of motion and neck supple.   Skin:     General: Skin is warm and dry.      Capillary Refill: Capillary refill takes less than 2 seconds.      Findings: No rash.      Comments: Normal color   Neurological:      General: No focal deficit present.      Mental Status: She is alert and oriented to person, place, and time.      GCS: GCS eye subscore is 4. GCS verbal subscore is 5. GCS motor subscore is 6.      Cranial Nerves: No cranial nerve deficit.      Gait: Gait normal.             ED Course     Labs Reviewed   CBC WITH DIFFERENTIAL WITH PLATELET - Abnormal; Notable for the following components:       Result Value    .0 (*)     All other components within normal limits   COMP METABOLIC PANEL (14) - Abnormal; Notable for the following components:    Glucose 107 (*)     Calculated Osmolality 301 (*)     Albumin 4.9 (*)     All other components within normal limits   LIPASE - Normal     Impression  CONCLUSION:     No acute intracranial hemorrhage, hydrocephalus, or mass effect.                 Dictated by (CST): Ros Pike MD on 10/23/2024 at 4:04 PM      Finalized by (CST): Ros Pike MD on 10/23/2024 at 4:07 PM           CONCLUSION:      Sclerosis with cortical buckling, without cortical step-off involving the C6 spinous process.  May represent an age indeterminate nondisplaced fracture, favoring subacute to chronic etiology based on imaging.  However, correlate with symptoms at this   location to assess for  acute nondisplaced fracture.      Mild multilevel degenerative disease of the spine resulting in up to moderate neural foraminal narrowing, as detailed above.            Dictated by (CST): Ros Pike MD on 10/23/2024 at 3:48 PM       Finalized by (CST): Ros Pike MD on 10/23/2024 at 3:56 PM         Impression  CONCLUSION:  1. No acute fracture or acute malalignment.    2. Mild levoscoliosis multilevel spondylosis has progressed           Dictated by (CST): Luis Alberto Ham MD on 10/23/2024 at 3:47 PM      Finalized by (CST): Luis Alberto Ham MD on 10/23/2024 at 3:49 PM        Impression  CONCLUSION:  1. No acute fracture or dislocation.  2. Osteoarthritic changes involving the wrist and hand with interval progression.           Dictated by (CST): Luis Alberto Ham MD on 10/23/2024 at 3:44 PM      Finalized by (CST): Luis Alberto Ham MD on 10/23/2024 at 3:46 PM             MDM              Medical Decision Making  65yo/f w hx and exam as stated; back pain, neck pain, headaches    Patient reports all symptoms are acute on chronic. Most of them for months  Her abd is soft ntnd; labs non acute  No fever  No vomiting  Ambulatory in ed  Xr l spine non acute  No point tenderness to correlate to findings on c spine, suspect non acute  Normal neuro exam    Plan  Dc to home  Close fu with PCP  Outpatient MRI ordered copy to PCP      Amount and/or Complexity of Data Reviewed  Labs:  Decision-making details documented in ED Course.  Radiology:  Decision-making details documented in ED Course.    Risk  OTC drugs.  Prescription drug management.        Disposition and Plan     Clinical Impression:  1. Moderate back pain    2. Acute nonintractable headache, unspecified headache type         Disposition:  Discharge  10/23/2024  4:39 pm    Follow-up:  Lakisha Hses MD  [address] [place] [address]  [phone]    Follow up in 2 day(s)            Medications Prescribed:  Current Discharge Medication  List              Supplementary Documentation:

## 2024-10-23 NOTE — ED QUICK NOTES
Patient ambulating in room without assistance or assistive devices.     This RN advised patient to sit in bed in order to prevent falls.     Patient now sitting in bed.

## 2024-10-23 NOTE — ED QUICK NOTES
Pt c/o severe back pain and feeling like she is going to pass out. Pt keeps getting put off wheelchairs. Pt advise to stay in wheelchair to prevent fall. Pt states \"why are you worry about the liability?\". Pt is yelling and and stating she is not being seen. Explained that unfortunately there is a wait.

## 2024-10-23 NOTE — TELEPHONE ENCOUNTER
She sent me a page regarding her MRI that she was not able to get MRI.  I called her back she did not answer I left her message to call.  Please follow tomorrow.

## 2024-10-23 NOTE — ED INITIAL ASSESSMENT (HPI)
Pt arrived via [place] [facility] from md office. Per pt she feels like she has a pinch nerve. States pain radiates from right side neck down to her leg.

## 2024-10-23 NOTE — PROGRESS NOTES
Subjective:     Patient ID: Juana Adler is a 64 year old female.  Presents for evaluation of the neck pain, lumbar pain leg pain.    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  She took Tylenol, was able to sleep some woke up today with excruciating pain on the right side of the body minimal movements causing pulling sensation and excruciating pain.  She barely can move, she is developing headaches, frequently feels like she can pass out.  She drove herself to my office from [place].  States that needs statement releasing back to her to work.  She was seen by another physician on October 3, with right-sided sciatic symptoms, she took prednisone which seems did not help.  She also having right lower quadrant abdominal pain which present for about a week now.  Patient had MRI of the lumbar spine done in July 2023 at [facility] seen in [facility] showed  12-L1: Minimal left paracentral disc protrusion. No spinal stenosis or neural foraminal narrowing.         L1-L2: No significant central spinal stenosis or neural foraminal narrowing.         L2-L3: No significant central spinal stenosis or neural foraminal narrowing.         L3-L4: No significant central spinal stenosis or neural foraminal narrowing.         L4-L5: No significant central spinal stenosis or neural foraminal narrowing.         L5-S1: There is a small right lateral disc protrusion. Prominent epidural fat is present at this   level. There is mild associated central spinal stenosis and narrowing the right lateral recess. No   significant neuroforaminal narrowing.         The medication patient taking lately Abilify 2 mg on and off not every day tramadol for pain, she is working with psychiatrist to adjust her  medications.  History/Other:   Review of Systems  Current Outpatient Medications   Medication Sig Dispense Refill    Multiple Vitamins-Minerals (MULTIVITAMIN ADULTS 50+) Oral Tab Take 1 tablet by mouth daily.      traMADol 50 MG Oral Tab Take 1 tablet (50 mg total) by mouth every 6 (six) hours as needed for Pain. 30 tablet 0    lidocaine 5 % External Patch APPLY 1 PATCH TO AFFECTED AREA DAILY AS NEEDED FOR 5-7 DAYS. PURCHASE OVER THE COUNTER IF NOT COVERED BY INSURANCE. 30 patch 1    ARIPiprazole (ABILIFY) 2 MG Oral Tab Take 1 tablet (2 mg total) by mouth daily.      albuterol (PROAIR HFA) 108 (90 Base) MCG/ACT Inhalation Aero Soln Inhale 2 puffs into the lungs every 6 (six) hours as needed for Wheezing or Shortness of Breath. 1 each 0    predniSONE 10 MG Oral Tab 4 tabs daily for 3 days, then 3 tabs daily for 3 days, then 2 tabs daily for 3 days, 1 tab daily for 3 days. 30 tablet 0     Allergies:Allergies[1]    Past Medical History:    Anxiety    Back pain    Bipolar affective (HCC)    Episcleritis    OS    Episcleritis of both eyes    \"Epiclertis Of Bilateral Eyes\"    Headache disorder    Herpes zoster    medication    High blood pressure    Osteoporosis    Prediabetes    Unspecified essential hypertension    controlled without medications, states only elevated when she is smoking cigarettes and drinking energy drinks      Past Surgical History:   Procedure Laterality Date    Appendectomy      Appendectomy      Colonoscopy      4-5 yrs ago    Hysterectomy      Total abdom hysterectomy      Upper gi endoscopy,exam        Family History   Problem Relation Age of Onset    Alcohol and Other Disorders Associated Mother     Pancreatic Cancer Mother     Heart Disease Father     Arthritis Father     Colon Cancer Maternal Grandmother     Colon Cancer Maternal Aunt     Breast Cancer Paternal Aunt 40    Cancer Other         pancreatic, family hx    Colon Cancer Other         family hx    Glaucoma Neg     Macular degeneration  Neg     Diabetes Neg       Social History:   Social History     Socioeconomic History    Marital status:    Tobacco Use    Smoking status: Former     Current packs/day: 0.00     Average packs/day: 0.5 packs/day for 20.0 years (10.0 ttl pk-yrs)     Types: Cigarettes     Start date: 2003     Quit date: 2023     Years since quittin.8     Passive exposure: Past    Smokeless tobacco: Former     Quit date: 2016   Vaping Use    Vaping status: Never Used   Substance and Sexual Activity    Alcohol use: No     Alcohol/week: 0.0 standard drinks of alcohol    Drug use: Not Currently   Other Topics Concern    Caffeine Concern Yes     Comment: coffee, tea, 3 cups daily   Social History Narrative    ** Merged History Encounter **          Social Drivers of Health     Financial Resource Strain: Low Risk  (3/12/2024)    Financial Resource Strain     Med Affordability: No   Food Insecurity: Not on File (2024)    Received from [facility]    Food Insecurity     Food: 0   Transportation Needs: No Transportation Needs (3/12/2024)    Transportation Needs     Lack of Transportation: No   Physical Activity: Not on File (2023)    Received from [facility]    Physical Activity     Physical Activity: 0   Stress: Not on File (2023)    Received from [facility]    Stress     Stress: 0   Social Connections: Not on File (9/15/2024)    Received from [facility]    Social Connections     Connectedness: 0   Housing Stability: Low Risk  (3/12/2024)    Housing Stability     Housing Instability: No        BP (!) 174/76 (BP Location: Left arm, Patient Position: Sitting, Cuff Size: adult)   Pulse 62   Ht 5' 1\" (1.549 m)   SpO2 99%   BMI 27.59 kg/m²    Physical Exam  Constitutional:       General: She is in acute distress.      Appearance: She is diaphoretic.      Comments: Patient in acute distress due to pain, tensing up her body.  Looks like he had initially spasm of the right hand.  Sweating profusely when she  moves, limps when she walks with difficulty using cane   Eyes:      Extraocular Movements: Extraocular movements intact.      Conjunctiva/sclera: Conjunctivae normal.      Pupils: Pupils are equal, round, and reactive to light.   Cardiovascular:      Rate and Rhythm: Normal rate and regular rhythm.   Pulmonary:      Effort: Pulmonary effort is normal.      Breath sounds: No wheezing or rhonchi.   Musculoskeletal:      Cervical back: Normal range of motion and neck supple.      Right lower leg: No edema.      Left lower leg: No edema.   Skin:     General: Skin is warm.      Coloration: Skin is not jaundiced.   Neurological:      General: No focal deficit present.      Mental Status: She is alert and oriented to person, place, and time.      Comments: Straight leg raising positive on the right side about 40 degree difficult to assess how the symptoms due to severe pain and distress   Psychiatric:         Mood and Affect: Mood normal.         Thought Content: Thought content normal.         Judgment: Judgment normal.         Assessment & Plan:       ICD-10-CM    1. Cervical radiculopathy will out disc herniation M54.12       2. Lumbar radiculopathy  M54.16       3. Right lower quadrant abdominal pain etiology to be determined R10.31    4        Dizziness etiology to be determined    5.      Elevated blood pressure possibly related to pain history of hypertension patient not on any medications   911 called, had to transfer patient to [place] emergency room, patient is not safe to drive    Meds This Visit:  Requested Prescriptions      No prescriptions requested or ordered in this encounter       Imaging & Referrals:  None            [1] No Known Allergies

## 2024-10-24 ENCOUNTER — PATIENT OUTREACH (OUTPATIENT)
Dept: CASE MANAGEMENT | Age: 64
End: 2024-10-24

## 2024-10-24 NOTE — TELEPHONE ENCOUNTER
Spoke to patient, she states that she has less neck pain she is using neck brace.  Still a lot of pain in the right leg in the right buttock, I advised her that I am placing order for MRI of the lumbar spine, I recommend her to follow-up with Dr. Saab physiatrist.  She will try tizanidine 4 mg every 8 hours as needed, no driving while on medication.  She states that she will not drive if she feels dizzy will take it easy this week, will try to go back to work to light duty.  I advised her not to come and see me tomorrow for follow-up next week or so.  Patient questioning if she needs to continue alendronate, a year ago she was told that she has osteoporosis, I advised that I do not have records of that, asked her to complete DEXA scan , order placed, and come for follow-up visit.

## 2024-10-24 NOTE — PROGRESS NOTES
Patient had recent Emergency Room visit, calling to offer Primary Care Physician follow-up appointment (discharged 10/23)    [facility]   [address] [place] [address] [phone]   Patient declined appointment; advised she has taken care of everything  Closing encounter

## 2024-10-29 ENCOUNTER — NURSE TRIAGE (OUTPATIENT)
Dept: INTERNAL MEDICINE CLINIC | Facility: CLINIC | Age: 64
End: 2024-10-29

## 2024-10-29 ENCOUNTER — TELEPHONE (OUTPATIENT)
Dept: ADMINISTRATIVE | Age: 64
End: 2024-10-29

## 2024-10-29 ENCOUNTER — HOSPITAL ENCOUNTER (OUTPATIENT)
Dept: MRI IMAGING | Facility: HOSPITAL | Age: 64
Discharge: HOME | End: 2024-10-29
Attending: INTERNAL MEDICINE
Payer: COMMERCIAL

## 2024-10-29 DIAGNOSIS — M54.16 LUMBAR RADICULITIS: ICD-10-CM

## 2024-10-29 PROCEDURE — 72148 MRI LUMBAR SPINE W/O DYE: CPT | Performed by: INTERNAL MEDICINE

## 2024-10-29 NOTE — TELEPHONE ENCOUNTER
Patient called to supply workers comp insurance information, was transferred to billing then to Forms.  Obtained insurance information from patient and provided to supervisor who will contact provider office.  Patient acknowledged and aware office may call if additional information required.

## 2024-10-29 NOTE — TELEPHONE ENCOUNTER
Patient is asking for appointment was injury at work right cheek buttocks    With numbness/tingling and having trouble with walking    Refusing to go to ER    Spoke with Dr. Cody     She was advised to proceed to  and stat mri was placed.    Provided her with number to call      Reason for Disposition   Numbness (i.e., loss of sensation) of the leg(s) or foot and sudden onset after back injury    Protocols used: Back Injury-A-[place]

## 2024-10-29 NOTE — TELEPHONE ENCOUNTER
spoke to patient spoke to patient advised her to make appointment so we can review MRI of the lumbar spine and she needs release back to work with possible restrictions

## 2024-10-31 ENCOUNTER — TELEPHONE (OUTPATIENT)
Dept: INTERNAL MEDICINE CLINIC | Facility: CLINIC | Age: 64
End: 2024-10-31

## 2024-10-31 NOTE — TELEPHONE ENCOUNTER
Spoke to patient, reviewed MRI of lumbar spine findings, disc protrusion L4-L5 L5-S1, bulging disc at other levels.  She has appointment with Dr. Colón pain management specialist.  She saw him in February of this year he also her Workmen's Comp. doctor on November 5, 2024.  She has appointment with the NP Alyssa Balbuena on November 4, she is looking release back to work perhaps to light duty.  I told her that she needs to be seen in the office to make this determination.  She stated that she reinjured herself past Sunday at work as of today does not him right leg pain still right buttock pain shooting up his spine going to the neck

## 2024-10-31 NOTE — TELEPHONE ENCOUNTER
Dr Cody=see below, thanks.       She is scheduled to see TVEIN Thompson, on Monday for her MRI results. She is requesting either a nurse or Dr. Cody to explain the results to her. We have the final results, but the provider still needs to review them. We will contact her once we have the recommendation.      Future Appointments   Date Time Provider Department Center   11/4/2024 11:00 AM Maggy Balbuena APRN [id number] [place]

## 2024-10-31 NOTE — TELEPHONE ENCOUNTER
Patient called back, very anxious to know the MRI spine results.   Informed that the message was already sent to Dr. Cody an hour ago and just waiting for her recommendation.

## 2024-11-04 ENCOUNTER — LAB ENCOUNTER (OUTPATIENT)
Dept: LAB | Age: 64
End: 2024-11-04
Attending: INTERNAL MEDICINE
Payer: COMMERCIAL

## 2024-11-04 ENCOUNTER — OFFICE VISIT (OUTPATIENT)
Dept: INTERNAL MEDICINE CLINIC | Facility: CLINIC | Age: 64
End: 2024-11-04

## 2024-11-04 VITALS
DIASTOLIC BLOOD PRESSURE: 76 MMHG | SYSTOLIC BLOOD PRESSURE: 104 MMHG | HEART RATE: 67 BPM | HEIGHT: 61 IN | BODY MASS INDEX: 27.38 KG/M2 | WEIGHT: 145 LBS

## 2024-11-04 DIAGNOSIS — D50.8 OTHER IRON DEFICIENCY ANEMIA: ICD-10-CM

## 2024-11-04 DIAGNOSIS — K80.20 CALCULUS OF GALLBLADDER WITHOUT CHOLECYSTITIS WITHOUT OBSTRUCTION: ICD-10-CM

## 2024-11-04 DIAGNOSIS — I71.43 INFRARENAL ABDOMINAL AORTIC ANEURYSM (AAA) WITHOUT RUPTURE (HCC): ICD-10-CM

## 2024-11-04 DIAGNOSIS — D64.89 ANEMIA DUE TO OTHER CAUSE, NOT CLASSIFIED: ICD-10-CM

## 2024-11-04 DIAGNOSIS — R73.9 HYPERGLYCEMIA: ICD-10-CM

## 2024-11-04 DIAGNOSIS — I99.8 FLUCTUATING BLOOD PRESSURE: Primary | ICD-10-CM

## 2024-11-04 DIAGNOSIS — M54.16 LUMBAR RADICULOPATHY: ICD-10-CM

## 2024-11-04 DIAGNOSIS — Z78.0 POST-MENOPAUSAL: ICD-10-CM

## 2024-11-04 LAB
ALBUMIN SERPL-MCNC: 5 G/DL (ref 3.2–4.8)
ALBUMIN/GLOB SERPL: 2.1 {RATIO} (ref 1–2)
ALP LIVER SERPL-CCNC: 87 U/L
ALT SERPL-CCNC: 27 U/L
ANION GAP SERPL CALC-SCNC: 8 MMOL/L (ref 0–18)
AST SERPL-CCNC: 21 U/L (ref ?–34)
BASOPHILS # BLD AUTO: 0.04 X10(3) UL (ref 0–0.2)
BASOPHILS NFR BLD AUTO: 0.5 %
BILIRUB SERPL-MCNC: 0.5 MG/DL (ref 0.2–1.1)
BUN BLD-MCNC: 12 MG/DL (ref 9–23)
BUN/CREAT SERPL: 16.7 (ref 10–20)
CALCIUM BLD-MCNC: 10.2 MG/DL (ref 8.7–10.4)
CHLORIDE SERPL-SCNC: 106 MMOL/L (ref 98–112)
CO2 SERPL-SCNC: 29 MMOL/L (ref 21–32)
CREAT BLD-MCNC: 0.72 MG/DL
DEPRECATED HBV CORE AB SER IA-ACNC: 61 NG/ML
DEPRECATED RDW RBC AUTO: 40.2 FL (ref 35.1–46.3)
EGFRCR SERPLBLD CKD-EPI 2021: 94 ML/MIN/1.73M2 (ref 60–?)
EOSINOPHIL # BLD AUTO: 0.06 X10(3) UL (ref 0–0.7)
EOSINOPHIL NFR BLD AUTO: 0.8 %
ERYTHROCYTE [DISTWIDTH] IN BLOOD BY AUTOMATED COUNT: 12.9 % (ref 11–15)
EST. AVERAGE GLUCOSE BLD GHB EST-MCNC: 146 MG/DL (ref 68–126)
FASTING STATUS PATIENT QL REPORTED: YES
FOLATE SERPL-MCNC: 14.2 NG/ML (ref 5.4–?)
GLOBULIN PLAS-MCNC: 2.4 G/DL (ref 2–3.5)
GLUCOSE BLD-MCNC: 93 MG/DL (ref 70–99)
HBA1C MFR BLD: 6.7 % (ref ?–5.7)
HCT VFR BLD AUTO: 40.1 %
HGB BLD-MCNC: 13.5 G/DL
IMM GRANULOCYTES # BLD AUTO: 0.02 X10(3) UL (ref 0–1)
IMM GRANULOCYTES NFR BLD: 0.3 %
IRON SATN MFR SERPL: 21 %
IRON SERPL-MCNC: 91 UG/DL
LYMPHOCYTES # BLD AUTO: 1.66 X10(3) UL (ref 1–4)
LYMPHOCYTES NFR BLD AUTO: 21.3 %
MCH RBC QN AUTO: 28.9 PG (ref 26–34)
MCHC RBC AUTO-ENTMCNC: 33.7 G/DL (ref 31–37)
MCV RBC AUTO: 85.9 FL
MONOCYTES # BLD AUTO: 0.41 X10(3) UL (ref 0.1–1)
MONOCYTES NFR BLD AUTO: 5.3 %
NEUTROPHILS # BLD AUTO: 5.59 X10 (3) UL (ref 1.5–7.7)
NEUTROPHILS # BLD AUTO: 5.59 X10(3) UL (ref 1.5–7.7)
NEUTROPHILS NFR BLD AUTO: 71.8 %
OSMOLALITY SERPL CALC.SUM OF ELEC: 295 MOSM/KG (ref 275–295)
PLATELET # BLD AUTO: 419 10(3)UL (ref 150–450)
POTASSIUM SERPL-SCNC: 4 MMOL/L (ref 3.5–5.1)
PROT SERPL-MCNC: 7.4 G/DL (ref 5.7–8.2)
RBC # BLD AUTO: 4.67 X10(6)UL
SODIUM SERPL-SCNC: 143 MMOL/L (ref 136–145)
TIBC SERPL-MCNC: 434 UG/DL (ref 250–425)
TRANSFERRIN SERPL-MCNC: 291 MG/DL (ref 250–380)
VIT B12 SERPL-MCNC: 464 PG/ML (ref 211–911)
WBC # BLD AUTO: 7.8 X10(3) UL (ref 4–11)

## 2024-11-04 PROCEDURE — 36415 COLL VENOUS BLD VENIPUNCTURE: CPT

## 2024-11-04 PROCEDURE — 84466 ASSAY OF TRANSFERRIN: CPT

## 2024-11-04 PROCEDURE — 80053 COMPREHEN METABOLIC PANEL: CPT

## 2024-11-04 PROCEDURE — 82728 ASSAY OF FERRITIN: CPT

## 2024-11-04 PROCEDURE — 82746 ASSAY OF FOLIC ACID SERUM: CPT

## 2024-11-04 PROCEDURE — 82607 VITAMIN B-12: CPT

## 2024-11-04 PROCEDURE — 83036 HEMOGLOBIN GLYCOSYLATED A1C: CPT

## 2024-11-04 PROCEDURE — 83540 ASSAY OF IRON: CPT

## 2024-11-04 PROCEDURE — 85025 COMPLETE CBC W/AUTO DIFF WBC: CPT

## 2024-11-04 NOTE — PROGRESS NOTES
Juana Adler is a 64 year old female.  HPI:   Pt presents to clinic with request to review her MRI result. She has chronic mid to low back pain. She is schedule with pain clinic tomorrow. She denies any paresthesias, falls, saddle block anesthesia, bilateral sciatica, loss of bladder/bowel control.     MRI SPINE LUMBAR (CPT=72148)    Result Date: 10/29/2024  CONCLUSION:   1. Suboptimal exam due to extensive patient motion.  2. Multilevel degenerative changes lumbar spine as above without significant spinal canal stenosis at any level.  3. Mild right and mild-to-moderate left neural foraminal stenosis at L5-S1.  4. Mild facet arthropathy throughout the lumbar spine as above.  5. Incidental infrarenal abdominal aortic aneurysm measuring up to 2.9 x 2.8 cm in greatest axial dimension.  6. Incidental cholelithiasis noted on the  imaging.  Please see above for further details.  LOCATION:  [facility]   Dictated by (CST): Chiki Valdez MD on 10/29/2024 at 10:15 PM     Finalized by (CST): Chiki Valdez MD on 10/29/2024 at 10:19 PM       CT BRAIN OR HEAD (CPT=70450)    Result Date: 10/23/2024  CONCLUSION:   No acute intracranial hemorrhage, hydrocephalus, or mass effect.      Dictated by (CST): Ros Pike MD on 10/23/2024 at 4:04 PM     Finalized by (CST): Ros Pike MD on 10/23/2024 at 4:07 PM           CT SPINE CERVICAL (CPT=72125)    Result Date: 10/23/2024  CONCLUSION:   Sclerosis with cortical buckling, without cortical step-off involving the C6 spinous process.  May represent an age indeterminate nondisplaced fracture, favoring subacute to chronic etiology based on imaging.  However, correlate with symptoms at this location to assess for acute nondisplaced fracture.  Mild multilevel degenerative disease of the spine resulting in up to moderate neural foraminal narrowing, as detailed above.    Dictated by (CST): Ros Pike MD on 10/23/2024 at 3:48 PM     Finalized by (CST): Ros Pike MD  on 10/23/2024 at 3:56 PM          XR LUMBAR SPINE (MIN 2 VIEWS) (CPT=72100)    Result Date: 10/23/2024  CONCLUSION:  1. No acute fracture or acute malalignment.  2. Mild levoscoliosis multilevel spondylosis has progressed    Dictated by (CST): Luis Alberto Ham MD on 10/23/2024 at 3:47 PM     Finalized by (CST): Luis Alberto Ham MD on 10/23/2024 at 3:49 PM          XR HAND (MIN 3 VIEWS), RIGHT (CPT=73130)    Result Date: 10/23/2024  CONCLUSION:  1. No acute fracture or dislocation. 2. Osteoarthritic changes involving the wrist and hand with interval progression.    Dictated by (CST): Luis Alberto aHm MD on 10/23/2024 at 3:44 PM     Finalized by (CST): Luis Alberto Ham MD on 10/23/2024 at 3:46 PM           Current Outpatient Medications   Medication Sig Dispense Refill    tiZANidine 4 MG Oral Tab Tablet p.o. every 8 hours as needed, can cause drowsiness no driving while on medication 60 tablet 0    predniSONE 10 MG Oral Tab 4 tabs daily for 3 days, then 3 tabs daily for 3 days, then 2 tabs daily for 3 days, 1 tab daily for 3 days. 30 tablet 0    Multiple Vitamins-Minerals (MULTIVITAMIN ADULTS 50+) Oral Tab Take 1 tablet by mouth daily.      traMADol 50 MG Oral Tab Take 1 tablet (50 mg total) by mouth every 6 (six) hours as needed for Pain. 30 tablet 0    lidocaine 5 % External Patch APPLY 1 PATCH TO AFFECTED AREA DAILY AS NEEDED FOR 5-7 DAYS. PURCHASE OVER THE COUNTER IF NOT COVERED BY INSURANCE. 30 patch 1    ARIPiprazole (ABILIFY) 2 MG Oral Tab Take 1 tablet (2 mg total) by mouth daily.      albuterol (PROAIR HFA) 108 (90 Base) MCG/ACT Inhalation Aero Soln Inhale 2 puffs into the lungs every 6 (six) hours as needed for Wheezing or Shortness of Breath. 1 each 0      Past Medical History:    Anxiety    Back pain    Bipolar affective (HCC)    Episcleritis    OS    Episcleritis of both eyes    \"Epiclertis Of Bilateral Eyes\"    Headache disorder    Herpes zoster    medication    High blood pressure     Osteoporosis    Prediabetes    Unspecified essential hypertension    controlled without medications, states only elevated when she is smoking cigarettes and drinking energy drinks      Social History:  Social History     Socioeconomic History    Marital status:    Tobacco Use    Smoking status: Former     Current packs/day: 0.00     Average packs/day: 0.5 packs/day for 20.0 years (10.0 ttl pk-yrs)     Types: Cigarettes     Start date: 2003     Quit date: 2023     Years since quittin.9     Passive exposure: Past    Smokeless tobacco: Former     Quit date: 2016   Vaping Use    Vaping status: Never Used   Substance and Sexual Activity    Alcohol use: No     Alcohol/week: 0.0 standard drinks of alcohol    Drug use: Not Currently   Other Topics Concern    Caffeine Concern Yes     Comment: coffee, tea, 3 cups daily   Social History Narrative    ** Merged History Encounter **          Social Drivers of Health     Financial Resource Strain: Low Risk  (3/12/2024)    Financial Resource Strain     Med Affordability: No   Food Insecurity: Not on File (2024)    Received from [employer]    Food Insecurity     Food: 0   Transportation Needs: No Transportation Needs (3/12/2024)    Transportation Needs     Lack of Transportation: No   Physical Activity: Not on File (2023)    Received from [facility]    Physical Activity     Physical Activity: 0   Stress: Not on File (2023)    Received from [facility]    Stress     Stress: 0   Social Connections: Not on File (9/15/2024)    Received from [employer]    Social Connections     Connectedness: 0   Housing Stability: Low Risk  (3/12/2024)    Housing Stability     Housing Instability: No        REVIEW OF SYSTEMS:   Review of Systems   All other systems reviewed and are negative.         EXAM:   /76   Pulse 67   Ht 5' 1\" (1.549 m)   Wt 145 lb (65.8 kg)   BMI 27.40 kg/m²     Physical Exam  Vitals reviewed.   Constitutional:       General: She is  not in acute distress.  Eyes:      General: No scleral icterus.     Conjunctiva/sclera: Conjunctivae normal.      Pupils: Pupils are equal, round, and reactive to light.   Cardiovascular:      Rate and Rhythm: Normal rate and regular rhythm.      Pulses: Normal pulses.      Heart sounds: Normal heart sounds. No murmur heard.  Pulmonary:      Effort: Pulmonary effort is normal. No respiratory distress.      Breath sounds: Normal breath sounds.   Abdominal:      General: Abdomen is flat. There is no distension.      Palpations: Abdomen is soft.      Tenderness: There is no abdominal tenderness. There is no guarding or rebound.   Musculoskeletal:      Right lower leg: No edema.      Left lower leg: No edema.   Skin:     General: Skin is warm.      Coloration: Skin is not jaundiced.      Findings: No bruising.   Neurological:      Mental Status: She is alert and oriented to person, place, and time.   Psychiatric:         Mood and Affect: Mood normal.         Judgment: Judgment normal.            ASSESSMENT AND PLAN:   1. Fluctuating blood pressure  Pt reports home BP varies daily from low 80's to 160's. Denies any CP, SOB, HA, dizziness, or other sx.   -Referral to Cardiology for further evaluation.   - [facility] CARDIOLOGY EXTERNAL    2. Infrarenal abdominal aortic aneurysm (AAA) without rupture (HCC)  -Noted on recent  lumbar MRI. Referral to vascular surgery, discussed need for cardiology evaluation for fluctuating BP readings.   - Vascular Surgery - In Network    3. Calculus of gallbladder without cholecystitis without obstruction  -currently asymptomatic but does endorse occasional RUQ discomfort (2/10 pain when sx present). Has not noticed if a/w eating.   -Ordering US gallbladder and referral to General surgery.   - Surgery Referral - In Network  - US GALLBLADDER (CPT=76705); Future    4. Lumbar radiculopathy  -Keep appt with pain clinic. Reschedule appt with physiatry.   -No red flags. Reviewed  concerning s/s.     5. Post-menopausal  - XR DEXA BONE DENSITOMETRY (CPT=77080); Future       The patient indicates understanding of these issues and agrees to the plan.  The patient is asked to return in 4 weeks.     The above note was creating using Dragon speech recognition technology. Please excuse any typos.

## 2024-11-08 ENCOUNTER — HOSPITAL ENCOUNTER (OUTPATIENT)
Dept: BONE DENSITY | Age: 64
Discharge: HOME | End: 2024-11-08
Attending: NURSE PRACTITIONER
Payer: COMMERCIAL

## 2024-11-08 DIAGNOSIS — Z78.0 POST-MENOPAUSAL: ICD-10-CM

## 2024-11-08 PROCEDURE — 77080 DXA BONE DENSITY AXIAL: CPT | Performed by: NURSE PRACTITIONER

## 2024-11-09 ENCOUNTER — HOSPITAL ENCOUNTER (OUTPATIENT)
Dept: ULTRASOUND IMAGING | Age: 64
Discharge: HOME | End: 2024-11-09
Attending: NURSE PRACTITIONER
Payer: COMMERCIAL

## 2024-11-09 DIAGNOSIS — K80.20 CALCULUS OF GALLBLADDER WITHOUT CHOLECYSTITIS WITHOUT OBSTRUCTION: ICD-10-CM

## 2024-11-09 PROCEDURE — 76705 ECHO EXAM OF ABDOMEN: CPT | Performed by: NURSE PRACTITIONER

## 2024-11-15 ENCOUNTER — TELEPHONE (OUTPATIENT)
Dept: INTERNAL MEDICINE CLINIC | Facility: CLINIC | Age: 64
End: 2024-11-15

## 2024-11-15 NOTE — TELEPHONE ENCOUNTER
Spoke with patient, Date of Birth verified  Patient was informed of lab result & MD recommendation, she stated understanding.        Blood test shows no anemia, improving iron level, normal sugar liver kidney function test, normal vitamin B12 and folic acid level.  Diabetic test hemoglobin A1c 6.7, it is diabetic range, you should follow low carbohydrate diet, do not consume sweets on a regular basis.  And we should recheck this level in 3-6 months again, to make sure it is not progressing to the point that you need to start medications.  Feel free to give my office a call if you have questions   Written by Shanna Cody MD on 11/5/2024  7:24 AM CST      Future Appointments   Date Time Provider Department Center   11/20/2024 10:20 AM Shanna Cody MD [id number] [place]   11/25/2024 10:30 AM Zafar Nix MD [facility]   12/5/2024  9:00 AM Najjar, Samer F, MD [facility]

## 2024-11-18 ENCOUNTER — TELEPHONE (OUTPATIENT)
Dept: INTERNAL MEDICINE CLINIC | Facility: CLINIC | Age: 64
End: 2024-11-18

## 2024-11-18 ENCOUNTER — TELEPHONE (OUTPATIENT)
Dept: FAMILY MEDICINE CLINIC | Facility: CLINIC | Age: 64
End: 2024-11-18

## 2024-11-18 NOTE — TELEPHONE ENCOUNTER
Patient called back. She wanted to know what location she was seen 10/2/24 by Dr Joe.    Patient informed she was seen at [place] office.

## 2024-11-18 NOTE — TELEPHONE ENCOUNTER
Patient calling to request for information from recent visit on 10/3.  Informed Patient, she was seen in the office on 10/2 by Dr. Joe for sciatica pain.  Patient was prescribed medications.  Patient verbalized understanding and no further action needed.

## 2024-11-20 ENCOUNTER — OFFICE VISIT (OUTPATIENT)
Dept: INTERNAL MEDICINE CLINIC | Facility: CLINIC | Age: 64
End: 2024-11-20

## 2024-11-20 VITALS
DIASTOLIC BLOOD PRESSURE: 80 MMHG | WEIGHT: 145 LBS | BODY MASS INDEX: 27.38 KG/M2 | SYSTOLIC BLOOD PRESSURE: 132 MMHG | HEIGHT: 61 IN | HEART RATE: 71 BPM

## 2024-11-20 DIAGNOSIS — G89.29 CHRONIC BILATERAL LOW BACK PAIN WITH RIGHT-SIDED SCIATICA: Primary | ICD-10-CM

## 2024-11-20 DIAGNOSIS — M54.41 CHRONIC BILATERAL LOW BACK PAIN WITH RIGHT-SIDED SCIATICA: Primary | ICD-10-CM

## 2024-11-20 DIAGNOSIS — E78.49 OTHER HYPERLIPIDEMIA: ICD-10-CM

## 2024-11-20 DIAGNOSIS — R73.9 HYPERGLYCEMIA: ICD-10-CM

## 2024-11-20 PROCEDURE — 99213 OFFICE O/P EST LOW 20 MIN: CPT | Performed by: INTERNAL MEDICINE

## 2024-11-20 PROCEDURE — 3079F DIAST BP 80-89 MM HG: CPT | Performed by: INTERNAL MEDICINE

## 2024-11-20 PROCEDURE — 3008F BODY MASS INDEX DOCD: CPT | Performed by: INTERNAL MEDICINE

## 2024-11-20 PROCEDURE — 3075F SYST BP GE 130 - 139MM HG: CPT | Performed by: INTERNAL MEDICINE

## 2024-11-21 ENCOUNTER — TELEPHONE (OUTPATIENT)
Dept: PHYSICAL THERAPY | Facility: HOSPITAL | Age: 64
End: 2024-11-21

## 2024-11-24 NOTE — PROGRESS NOTES
Subjective:     Patient ID: Juana Adler is a 64 year old female.  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  Recent blood test showed hemoglobin A1c 6.7, normal CBC, low normal iron level elevated TIBC, normal folic acid and vitamin B12 level patient has history of being prediabetic.    Current Outpatient Medications   Medication Sig Dispense Refill    tiZANidine 4 MG Oral Tab Tablet p.o. every 8 hours as needed, can cause drowsiness no driving while on medication 60 tablet 0    Multiple Vitamins-Minerals (MULTIVITAMIN ADULTS 50+) Oral Tab Take 1 tablet by mouth daily.      traMADol 50 MG Oral Tab Take 1 tablet (50 mg total) by mouth every 6 (six) hours as needed for Pain. 30 tablet 0    ARIPiprazole (ABILIFY) 2 MG Oral Tab Take 1 tablet (2 mg total) by mouth daily.      lidocaine 5 % External Patch APPLY 1 PATCH TO AFFECTED AREA DAILY AS NEEDED FOR 5-7 DAYS. PURCHASE OVER THE COUNTER IF NOT COVERED BY INSURANCE. 30 patch 1    albuterol (PROAIR HFA) 108 (90 Base) MCG/ACT Inhalation Aero Soln Inhale 2 puffs into the lungs every 6 (six) hours as needed for Wheezing or Shortness of Breath. 1 each 0     Allergies:Allergies[1]    Past Medical History:    Anxiety    Back pain    Bipolar affective (HCC)    Episcleritis    OS    Episcleritis of both eyes    \"Epiclertis Of Bilateral Eyes\"    Headache disorder    Herpes zoster    medication    High blood pressure    Osteoporosis    Prediabetes    Unspecified essential hypertension    controlled without medications, states only elevated when she is smoking cigarettes and drinking energy drinks      Past Surgical History:   Procedure Laterality Date    Appendectomy      Appendectomy      Colonoscopy      4-5 yrs  ago    Hysterectomy      Total abdom hysterectomy      Upper gi endoscopy,exam        Family History   Problem Relation Age of Onset    Alcohol and Other Disorders Associated Mother     Pancreatic Cancer Mother     Heart Disease Father     Arthritis Father     Colon Cancer Maternal Grandmother     Colon Cancer Maternal Aunt     Breast Cancer Paternal Aunt 40    Cancer Other         pancreatic, family hx    Colon Cancer Other         family hx    Glaucoma Neg     Macular degeneration Neg     Diabetes Neg       Social History:   Social History     Socioeconomic History    Marital status:    Tobacco Use    Smoking status: Former     Current packs/day: 0.00     Average packs/day: 0.5 packs/day for 20.0 years (10.0 ttl pk-yrs)     Types: Cigarettes     Start date: 2003     Quit date: 2023     Years since quittin.9     Passive exposure: Past    Smokeless tobacco: Former     Quit date: 2016   Vaping Use    Vaping status: Never Used   Substance and Sexual Activity    Alcohol use: No     Alcohol/week: 0.0 standard drinks of alcohol    Drug use: Not Currently   Other Topics Concern    Caffeine Concern Yes     Comment: coffee, tea, 3 cups daily   Social History Narrative    ** Merged History Encounter **          Social Drivers of Health     Financial Resource Strain: Low Risk  (3/12/2024)    Financial Resource Strain     Med Affordability: No   Food Insecurity: Not on File (2024)    Received from [employer]    Food Insecurity     Food: 0   Transportation Needs: No Transportation Needs (3/12/2024)    Transportation Needs     Lack of Transportation: No   Physical Activity: Not on File (2023)    Received from [facility]    Physical Activity     Physical Activity: 0   Stress: Not on File (2023)    Received from [facility]    Stress     Stress: 0   Social Connections: Not on File (9/15/2024)    Received from [employer]    Social Connections     Connectedness: 0   Housing Stability: Low Risk   (3/12/2024)    Housing Stability     Housing Instability: No        /80 (BP Location: Left arm, Patient Position: Sitting, Cuff Size: adult)   Pulse 71   Ht 5' 1\" (1.549 m)   Wt 145 lb (65.8 kg)   BMI 27.40 kg/m²    Physical Exam  Constitutional:       General: She is not in acute distress.     Appearance: Normal appearance. She is not ill-appearing.   Cardiovascular:      Rate and Rhythm: Normal rate and regular rhythm.      Heart sounds: No murmur heard.     No gallop.   Pulmonary:      Effort: Pulmonary effort is normal. No respiratory distress.      Breath sounds: Normal breath sounds.   Musculoskeletal:      Cervical back: Normal range of motion and neck supple.   Skin:     General: Skin is warm.      Coloration: Skin is not jaundiced.   Neurological:      General: No focal deficit present.      Mental Status: She is alert and oriented to person, place, and time.      Motor: No weakness.      Gait: Gait normal.      Comments: Moves without restriction,   Psychiatric:         Mood and Affect: Mood normal.         Behavior: Behavior normal.         Thought Content: Thought content normal.         Judgment: Judgment normal.         Assessment & Plan:   1. Chronic bilateral low back pain with right-sided sciatica continue physical therapy, follow-up with pain specialist as planned/[facility]  physician/     2. Hyperglycemia discussed importance of following low carbohydrate diet, avoid weight gain, eliminate plain sugar and daily use, will check hemoglobin A1c CMP in 3 months   3. Other hyperlipidemia will check lipids for the next blood draw       Orders Placed This Encounter   Procedures    CBC With Differential With Platelet    Comp Metabolic Panel (14)    Lipid Panel    Hemoglobin A1C       Meds This Visit:  Requested Prescriptions      No prescriptions requested or ordered in this encounter       Imaging & Referrals:  OP REFERRAL TO [facility]     Follow-up in 3 months       [1] No  Known Allergies

## 2024-11-25 ENCOUNTER — OFFICE VISIT (OUTPATIENT)
Dept: SURGERY | Facility: CLINIC | Age: 64
End: 2024-11-25

## 2024-11-25 VITALS
BODY MASS INDEX: 27.19 KG/M2 | WEIGHT: 144 LBS | DIASTOLIC BLOOD PRESSURE: 58 MMHG | SYSTOLIC BLOOD PRESSURE: 107 MMHG | HEIGHT: 61 IN

## 2024-11-25 DIAGNOSIS — K80.20 CALCULUS OF GALLBLADDER WITHOUT CHOLECYSTITIS WITHOUT OBSTRUCTION: Primary | ICD-10-CM

## 2024-11-25 PROCEDURE — 3078F DIAST BP <80 MM HG: CPT | Performed by: SURGERY

## 2024-11-25 PROCEDURE — 99204 OFFICE O/P NEW MOD 45 MIN: CPT | Performed by: SURGERY

## 2024-11-25 PROCEDURE — 3008F BODY MASS INDEX DOCD: CPT | Performed by: SURGERY

## 2024-11-25 PROCEDURE — 3074F SYST BP LT 130 MM HG: CPT | Performed by: SURGERY

## 2024-11-25 NOTE — H&P
History and Physical      HPI     Chief Complaint   Patient presents with    Referral     Referral from Dr. Shanna Cody for Gallstones.  Patient reports intermittent pain after eating spicy or greasy foods.  Patient denies pain today.  She has US that confirmed diagnosis.           HPI  Juana Adler is a 64 year old female who presents with asymptomatic gallstones.  They were found on MRI for radiculopathy.  Ultrasound shows single 1 cm gallstone no evidence of inflammation or cholecystitis.  Patient denies any abdominal pain and prefers to monitor and change her diet.    Past Medical History:    Anxiety    Back pain    Bipolar affective (HCC)    Episcleritis    OS    Episcleritis of both eyes    \"Epiclertis Of Bilateral Eyes\"    Headache disorder    Herpes zoster    medication    High blood pressure    Osteoporosis    Prediabetes    Unspecified essential hypertension    controlled without medications, states only elevated when she is smoking cigarettes and drinking energy drinks     Past Surgical History:   Procedure Laterality Date    Appendectomy      Appendectomy      Colonoscopy      4-5 yrs ago    Hysterectomy      Total abdom hysterectomy      Upper gi endoscopy,exam       Current Outpatient Medications   Medication Sig Dispense Refill    tiZANidine 4 MG Oral Tab Tablet p.o. every 8 hours as needed, can cause drowsiness no driving while on medication 60 tablet 0    Multiple Vitamins-Minerals (MULTIVITAMIN ADULTS 50+) Oral Tab Take 1 tablet by mouth daily.      traMADol 50 MG Oral Tab Take 1 tablet (50 mg total) by mouth every 6 (six) hours as needed for Pain. 30 tablet 0    lidocaine 5 % External Patch APPLY 1 PATCH TO AFFECTED AREA DAILY AS NEEDED FOR 5-7 DAYS. PURCHASE OVER THE COUNTER IF NOT COVERED BY INSURANCE. 30 patch 1    ARIPiprazole (ABILIFY) 2 MG Oral Tab Take 1 tablet (2 mg total) by mouth daily.      albuterol (PROAIR HFA) 108 (90 Base) MCG/ACT Inhalation Aero Soln Inhale 2 puffs into the  lungs every 6 (six) hours as needed for Wheezing or Shortness of Breath. 1 each 0     ALLERGIES  Allergies[1]    Social History     Socioeconomic History    Marital status:    Tobacco Use    Smoking status: Former     Current packs/day: 0.00     Average packs/day: 0.5 packs/day for 20.0 years (10.0 ttl pk-yrs)     Types: Cigarettes     Start date: 2003     Quit date: 2023     Years since quittin.9     Passive exposure: Past    Smokeless tobacco: Former     Quit date: 2016   Vaping Use    Vaping status: Never Used   Substance and Sexual Activity    Alcohol use: No     Alcohol/week: 0.0 standard drinks of alcohol    Drug use: Not Currently     Family History   Problem Relation Age of Onset    Alcohol and Other Disorders Associated Mother     Pancreatic Cancer Mother     Heart Disease Father     Arthritis Father     Colon Cancer Maternal Grandmother     Colon Cancer Maternal Aunt     Breast Cancer Paternal Aunt 40    Cancer Other         pancreatic, family hx    Colon Cancer Other         family hx    Glaucoma Neg     Macular degeneration Neg     Diabetes Neg        Review of Systems   A comprehensive 10 point review of systems was completed.  Pertinent positives and negatives noted in the the HPI.    PHYSICAL EXAM   /58 (BP Location: Right arm)   Ht 5' 1\" (1.549 m)   Wt 144 lb (65.3 kg)   BMI 27.21 kg/m²  No LMP recorded. Patient has had a hysterectomy.   Constitutional: appears well hydrated alert and responsive no acute distress noted  Head/Face: normocephalic  Nose/Mouth/Throat: nose and throat are clear palate is intact mucous membranes are moist no oral lesions are noted  Neck/Thyroid: neck is supple without adenopathy  Respiratory: normal to inspection lungs are clear to auscultation bilaterally normal respiratory effort  Cardiovascular: regular rate and rhythm no murmurs, gallups, or rubs  Abdomen: soft non-tender non-distended no organomegaly noted no masses  Extremities: no  edema, cyanosis, or clubbing  Neurological: exam appropriate for age reflexes and motor skills appropriate for age      ASSESSMENT/PLAN   Assessment   Encounter Diagnosis   Name Primary?    Calculus of gallbladder without cholecystitis without obstruction Yes       64 year old female with a symptomatic cholelithiasis  We have discussed the surgical risks, benefits, alternatives, and expected recovery. We will plan observation, dietary changes.  If she develops problems I can see her in plan elective laparoscopic cholecystectomy. All of the patient's questions have been answered to her satisfaction.       11/25/2024  Zafar Nix MD               [1] No Known Allergies

## 2024-11-27 ENCOUNTER — TELEPHONE (OUTPATIENT)
Dept: SURGERY | Facility: CLINIC | Age: 64
End: 2024-11-27

## 2024-11-27 NOTE — TELEPHONE ENCOUNTER
Fluorouracil (Efudex / Colby Finnegan)    Your physician has prescribed a topical medication that is used to treat pre-cancerous skin lesions and certain types of skin cancers. We are providing you with the following information so that you understand how the medication should be used and potential side effects you may experience. Clean and dry the areas of the skin that will be treated. Apply a small amount of the medication to your fingertip. Dab the medication onto the designated areas and rub it in. Discontinue the medication once the skin starts to scab. Typically this takes between 2 and 4 weeks after starting the medication. Avoid applying the medication in or around the eyes or eyelids. If the medication gets into your eyes, nose or mouth, rinse immediately. Wash your hands immediately after application. Side effects include: burning, redness, irritation, dryness, pain, swelling and changes in skin color. Vaseline and/or cool compresses may be applied to affected areas for comfort. Please note that you may be more sensitive to the sun. Use sunscreen and wear protective clothing when outdoors. Avoid prolonged sun exposure. Once you have discontinued the medication, apply vaseline several times daily until the skin has healed. Monitor. Per patient doctor did not prescribe any medications to dissolve gallstones. Please advise.

## 2024-11-27 NOTE — TELEPHONE ENCOUNTER
I called and spoke to pt. She heard about possible medication to dissolve gallstones.   She was wondering if Dr. Nix does that.   She said she is not having any pain so they decided against surgery for now.    I explained Dr. Nix does not prescribe medication to dissolve gallstones. Explained if needed they do surgery.   Explained if she wishes she can discuss this medication with her PCP.    Pt agrees and she will just try to adjust her diet. She will call back if she develops pain.

## 2024-12-09 ENCOUNTER — TELEPHONE (OUTPATIENT)
Dept: PHYSICAL THERAPY | Facility: HOSPITAL | Age: 64
End: 2024-12-09

## 2024-12-12 ENCOUNTER — APPOINTMENT (OUTPATIENT)
Dept: PHYSICAL THERAPY | Facility: HOSPITAL | Age: 64
End: 2024-12-12
Attending: INTERNAL MEDICINE

## 2024-12-19 ENCOUNTER — APPOINTMENT (OUTPATIENT)
Dept: PHYSICAL THERAPY | Facility: HOSPITAL | Age: 64
End: 2024-12-19
Attending: INTERNAL MEDICINE

## 2024-12-23 ENCOUNTER — APPOINTMENT (OUTPATIENT)
Dept: PHYSICAL THERAPY | Facility: HOSPITAL | Age: 64
End: 2024-12-23
Attending: INTERNAL MEDICINE

## 2024-12-26 ENCOUNTER — APPOINTMENT (OUTPATIENT)
Dept: PHYSICAL THERAPY | Facility: HOSPITAL | Age: 64
End: 2024-12-26
Attending: INTERNAL MEDICINE

## 2025-01-07 ENCOUNTER — TELEPHONE (OUTPATIENT)
Dept: INTERNAL MEDICINE CLINIC | Facility: CLINIC | Age: 65
End: 2025-01-07

## 2025-01-07 NOTE — TELEPHONE ENCOUNTER
On Call:   Pt reports severe abdominal pain radiating to back, nausea. Reports hx of gallstones. Advised ER/call 911. Pt is agreeable and will go to ER. Advised to f/u with PCP once discharged.

## 2025-01-09 ENCOUNTER — TELEPHONE (OUTPATIENT)
Dept: INTERNAL MEDICINE CLINIC | Facility: CLINIC | Age: 65
End: 2025-01-09

## 2025-01-09 NOTE — TELEPHONE ENCOUNTER
Patient (name and  verified) calling with an update regarding her recent ER visits on 25 and 25.     Offered an appt for today but patient declines as she has to go to work. Offered a follow up appt for tomorrow with another provider.     Future Appointments   Date Time Provider Department Center   1/10/2025 11:30 AM Esther Armijo MD [facility] [place]

## 2025-01-10 ENCOUNTER — OFFICE VISIT (OUTPATIENT)
Dept: INTERNAL MEDICINE CLINIC | Facility: CLINIC | Age: 65
End: 2025-01-10

## 2025-01-10 VITALS
BODY MASS INDEX: 27 KG/M2 | HEART RATE: 74 BPM | SYSTOLIC BLOOD PRESSURE: 130 MMHG | HEIGHT: 61 IN | TEMPERATURE: 98 F | RESPIRATION RATE: 18 BRPM | OXYGEN SATURATION: 98 % | WEIGHT: 143 LBS | DIASTOLIC BLOOD PRESSURE: 72 MMHG

## 2025-01-10 DIAGNOSIS — S13.4XXD WHIPLASH INJURY TO NECK, SUBSEQUENT ENCOUNTER: Primary | ICD-10-CM

## 2025-01-10 DIAGNOSIS — M12.811 ROTATOR CUFF ARTHROPATHY OF RIGHT SHOULDER: ICD-10-CM

## 2025-01-10 DIAGNOSIS — M54.31 SCIATICA, RIGHT SIDE: ICD-10-CM

## 2025-01-10 DIAGNOSIS — M79.675 PAIN IN LEFT TOE(S): ICD-10-CM

## 2025-01-10 PROCEDURE — 99214 OFFICE O/P EST MOD 30 MIN: CPT | Performed by: INTERNAL MEDICINE

## 2025-01-10 RX ORDER — TRAMADOL HYDROCHLORIDE 50 MG/1
50 TABLET ORAL EVERY 6 HOURS PRN
Qty: 30 TABLET | Refills: 0 | Status: SHIPPED | OUTPATIENT
Start: 2025-01-10
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
## 2025-01-15 ENCOUNTER — TELEPHONE (OUTPATIENT)
Dept: INTERNAL MEDICINE CLINIC | Facility: CLINIC | Age: 65
End: 2025-01-15

## 2025-01-15 NOTE — TELEPHONE ENCOUNTER
Patient calling ( name and date of birth of patient verified ) asking about referral information for Physical Therapy    Provided information   for PT     To schedule Physical Therapy at any of the [facility] facilities, please call [phone].       Patient verbalizes understanding and agrees with plan.

## 2025-01-30 ENCOUNTER — TELEPHONE (OUTPATIENT)
Dept: PHYSICAL THERAPY | Facility: HOSPITAL | Age: 65
End: 2025-01-30

## 2025-01-31 ENCOUNTER — APPOINTMENT (OUTPATIENT)
Dept: PHYSICAL THERAPY | Facility: HOSPITAL | Age: 65
End: 2025-01-31
Attending: INTERNAL MEDICINE
Payer: OTHER MISCELLANEOUS

## 2025-02-10 ENCOUNTER — APPOINTMENT (OUTPATIENT)
Dept: PHYSICAL THERAPY | Facility: HOSPITAL | Age: 65
End: 2025-02-10
Attending: INTERNAL MEDICINE
Payer: OTHER MISCELLANEOUS

## 2025-02-24 ENCOUNTER — APPOINTMENT (OUTPATIENT)
Dept: PHYSICAL THERAPY | Facility: HOSPITAL | Age: 65
End: 2025-02-24
Attending: INTERNAL MEDICINE
Payer: OTHER MISCELLANEOUS

## 2025-02-26 ENCOUNTER — APPOINTMENT (OUTPATIENT)
Dept: PHYSICAL THERAPY | Facility: HOSPITAL | Age: 65
End: 2025-02-26
Attending: INTERNAL MEDICINE
Payer: OTHER MISCELLANEOUS

## 2025-03-03 ENCOUNTER — APPOINTMENT (OUTPATIENT)
Dept: PHYSICAL THERAPY | Facility: HOSPITAL | Age: 65
End: 2025-03-03
Attending: INTERNAL MEDICINE
Payer: OTHER MISCELLANEOUS

## 2025-03-05 ENCOUNTER — APPOINTMENT (OUTPATIENT)
Dept: PHYSICAL THERAPY | Facility: HOSPITAL | Age: 65
End: 2025-03-05
Attending: INTERNAL MEDICINE
Payer: OTHER MISCELLANEOUS

## 2025-06-13 NOTE — LETTER
Date & Time: 10/23/2024, 4:48 PM  Patient: Juana Adler  Encounter Provider(s):    Molina Morin APRN       To Whom It May Concern:    Juana Adler was seen and treated in our department on 10/23/2024. She should not return to work until 10/27/2024 .    If you have any questions or concerns, please do not hesitate to call.        _____________________________  Physician/APC Signature           
Pt BIBA from home, c/o nosebleed since 0430, on eliquis, on O2 nasal cannula 3L daily, room air O2 89%

## 2025-06-16 PROBLEM — E78.2 HYPERLIPIDEMIA, MIXED: Status: ACTIVE | Noted: 2024-11-25

## 2025-06-16 PROBLEM — M25.562 PAIN IN LEFT KNEE: Status: ACTIVE | Noted: 2020-05-21

## 2025-06-16 PROBLEM — S62.304A FRACTURE OF FOURTH METACARPAL BONE OF RIGHT HAND: Status: ACTIVE | Noted: 2021-07-21

## 2025-06-16 PROBLEM — M76.51 PATELLAR TENDINITIS, RIGHT KNEE: Status: ACTIVE | Noted: 2020-06-16

## 2025-06-16 PROBLEM — I71.9 AORTIC ANEURYSM: Status: ACTIVE | Noted: 2024-11-25

## 2025-06-16 PROBLEM — M25.531 RIGHT WRIST PAIN: Status: ACTIVE | Noted: 2020-03-25

## 2025-06-16 PROBLEM — M79.671 PAIN IN RIGHT FOOT: Status: ACTIVE | Noted: 2020-05-21

## 2025-06-16 PROBLEM — K52.9 NONSPECIFIC COLITIS: Status: ACTIVE | Noted: 2021-07-21

## 2025-06-17 ENCOUNTER — OFFICE VISIT (OUTPATIENT)
Dept: FAMILY MEDICINE CLINIC | Facility: CLINIC | Age: 65
End: 2025-06-17
Payer: COMMERCIAL

## 2025-06-17 VITALS
HEART RATE: 68 BPM | RESPIRATION RATE: 18 BRPM | HEIGHT: 62.5 IN | DIASTOLIC BLOOD PRESSURE: 72 MMHG | TEMPERATURE: 98 F | OXYGEN SATURATION: 97 % | SYSTOLIC BLOOD PRESSURE: 110 MMHG | BODY MASS INDEX: 25.54 KG/M2 | WEIGHT: 142.38 LBS

## 2025-06-17 DIAGNOSIS — Z00.00 ANNUAL PHYSICAL EXAM: Primary | ICD-10-CM

## 2025-06-17 DIAGNOSIS — Z13.29 SCREENING FOR ENDOCRINE, NUTRITIONAL, METABOLIC AND IMMUNITY DISORDER: ICD-10-CM

## 2025-06-17 DIAGNOSIS — K80.20 CALCULUS OF GALLBLADDER WITHOUT CHOLECYSTITIS WITHOUT OBSTRUCTION: ICD-10-CM

## 2025-06-17 DIAGNOSIS — E78.2 HYPERLIPIDEMIA, MIXED: ICD-10-CM

## 2025-06-17 DIAGNOSIS — R73.03 PREDIABETES: ICD-10-CM

## 2025-06-17 DIAGNOSIS — I71.43 INFRARENAL ABDOMINAL AORTIC ANEURYSM (AAA) WITHOUT RUPTURE: ICD-10-CM

## 2025-06-17 DIAGNOSIS — Z13.0 SCREENING FOR ENDOCRINE, NUTRITIONAL, METABOLIC AND IMMUNITY DISORDER: ICD-10-CM

## 2025-06-17 DIAGNOSIS — Z91.199 NON-COMPLIANCE: ICD-10-CM

## 2025-06-17 DIAGNOSIS — K74.69 OTHER CIRRHOSIS OF LIVER (HCC): ICD-10-CM

## 2025-06-17 DIAGNOSIS — Z12.31 SCREENING MAMMOGRAM FOR BREAST CANCER: ICD-10-CM

## 2025-06-17 DIAGNOSIS — Z13.21 SCREENING FOR ENDOCRINE, NUTRITIONAL, METABOLIC AND IMMUNITY DISORDER: ICD-10-CM

## 2025-06-17 DIAGNOSIS — M12.811 ROTATOR CUFF ARTHROPATHY OF RIGHT SHOULDER: ICD-10-CM

## 2025-06-17 DIAGNOSIS — Z13.228 SCREENING FOR ENDOCRINE, NUTRITIONAL, METABOLIC AND IMMUNITY DISORDER: ICD-10-CM

## 2025-06-17 DIAGNOSIS — Z01.84 IMMUNITY STATUS TESTING: ICD-10-CM

## 2025-06-17 NOTE — PROGRESS NOTES
Subjective:   Juana Adler is a 65 year old female who presents for complete px.  She is a new pt to the clinic.       History/Other:   History of Present Illness  Pt reports numerous issues.  It was difficult keeping her on one subject at a time.      She has been experiencing severe pain related to gallstones, which has improved recently with dietary changes, including consuming a smoothie with stefanie and fennel. Initially, the pain was 'extraordinary' across her back and lower right abdomen, but it has lessened over the past few days.  She was evaluated by general surgery who had recommended cholecystectomy but patient did not want to do this and declined.  Recent imaging showed numerous stones without inflammation. She uses Advil frequently for pain management.      She had a CT angiogram of the abdomen and pelvis in January of this year at [facility] which did not show these findings as well as possible liver cirrhosis and subcentimeter foci in the liver that may represent hemangiomas. She denies any significant alcohol use, stating she drinks wine occasionally, about once a month, and has a history of drinking more heavily in her youth.  Her mother had suffered from alcoholism.  Unclear if patient has a history of narcotic use.  I asked her several times and she would change the subject.  The CT angiogram also showed infrarenal abdominal aortic aneurysm measuring 3.1 x 2.8 cm.  It appears she did follow-up with cardiology although she does not know where or who.  They recommended cholesterol medicine but patient did not like it and stopped taking it.  Does not know who she saw    She reports chronic shoulder pain, which has worsened over the past few months, affecting her daily activities such as dressing. She recalls being referred to an orthopedic specialist for a rotator cuff issue in January of this year but never followed up.  She did briefly do PT.  Lately has been using Biofreeze.  Tells me she has  been improving for the past few months but then worsened over the past month.      Also needs \"titers\" for her job.  Is working the [employer].  She does not know what kind of titers she needs but frequently will mention measles.  She does not have a specific form with the requirement listed on it.     Chief Complaint Reviewed and Verified  Nursing Notes Reviewed and   Verified  Tobacco Reviewed  Allergies Reviewed  Medications Reviewed    Medical History Reviewed  Surgical History Reviewed  OB Status Reviewed    Family History Reviewed  Social History Reviewed         Tobacco:  She smoked tobacco in the past but quit greater than 12 months ago.  Tobacco Use[1]     Current Medications[2]    PHQ-9 TOTAL SCORE: 6  , done 6/17/2025   Trouble falling or staying asleep, or sleeping too much: 2     Feeling tired or having little energy: 3    Trouble concentrating on things, such as reading the newspaper or watching television: 1    If you checked off any problems, how difficult have these problems made it for you to do your work, take care of things at home, or get along with other people?: Somewhat difficult    Last [place] Suicide Screening on 6/17/2025 was No Risk.       Review of Systems:  See HPI    Objective:   /72 (BP Location: Left arm, Patient Position: Sitting, Cuff Size: adult)   Pulse 68   Temp 97.9 °F (36.6 °C) (Temporal)   Resp 18   Ht 5' 2.5\" (1.588 m)   Wt 142 lb 6.4 oz (64.6 kg)   SpO2 97%   BMI 25.63 kg/m²  Estimated body mass index is 25.63 kg/m² as calculated from the following:    Height as of this encounter: 5' 2.5\" (1.588 m).    Weight as of this encounter: 142 lb 6.4 oz (64.6 kg).  Results  RADIOLOGY  Gallbladder ultrasound: Numerous stones without inflammation (11/2024)  CT angiogram of the abdomen and pelvis: Inferior abdominal aortic aneurysm 3.1 x 2.8 cm, mild liver cirrhosis, few subcentimeter foci in the liver possibly hemangiomas, few small colonic  diverticula (01/2025)     Physical Exam  GENERAL: Alert, cooperative, well developed, no acute distress.  HEENT: Normocephalic, normal oropharynx, moist mucous membranes.  NECK: Supple.  CHEST: Clear to auscultation bilaterally, no wheezes, rhonchi, or crackles.  CARDIOVASCULAR: Normal heart rate and rhythm, S1 and S2 normal without murmurs.  ABDOMEN: Soft, non-tender, non-distended, without organomegaly, normal bowel sounds.   EXTREMITIES: No cyanosis or edema.  NEUROLOGICAL: no focal deficits       Assessment & Plan:   1. Annual physical exam    2. Prediabetes    3. Infrarenal abdominal aortic aneurysm (AAA) without rupture    4. Rotator cuff arthropathy of right shoulder    5. Hyperlipidemia, mixed    6. Non-compliance    7. Calculus of gallbladder without cholecystitis without obstruction    8. Other cirrhosis of liver (HCC)    9. Immunity status testing    10. Screening mammogram for breast cancer    11. Screening for endocrine, nutritional, metabolic and immunity disorder        I have spent 30 minutes in the care of this patient outside of her physical, reviewing previous records, imaging, etc.    Assessment & Plan    Screening labs-CMP, CBC, lipids, TSH.  Also obtain A1c due to history of prediabetes  No further Paps needed due to history of hysterectomy  3.  Gave order for mammogram  4.  Colonoscopy UTD  5.  Patient is unclear on what titers she needed.  She does mention needles.  Will obtain MMR immunity status testing.  She did not have paperwork today saying what she needed  6.  Infrarenal abdominal aortic aneurysm.  Absolutely needs to have cardiology follow-up with this.  Stressed the importance of this and discussed if this was to rupture this could be fatal for her.  Gave referral to cardiology today.  7.  Recommend liver ultrasound with elastography to evaluate for possible cirrhosis.  Unclear at this moment her alcohol status or whether or not she has used narcotics previously.  Likely will need to  follow-up with hepatology but see what results show first.  8.  Needs ortho f/u for R shoulder pain.  Was already referred in  of this year by previous PCP and never followed up.  Gave new referral  9.  Strongly recommend she re-consider cholecystectomy with gen surg for her known gallstones   10.  F/u 1 yr for next physical or sooner if needed       Kay Terry DO, 2025, 11:21 AM         [1]   Social History  Tobacco Use   Smoking Status Former    Current packs/day: 0.00    Average packs/day: 0.5 packs/day for 20.0 years (10.0 ttl pk-yrs)    Types: Cigarettes    Start date: 2003    Quit date: 2023    Years since quittin.5    Passive exposure: Past   Smokeless Tobacco Former    Quit date: 2016   [2]   Current Outpatient Medications   Medication Sig Dispense Refill    traMADol 50 MG Oral Tab Take 1 tablet (50 mg total) by mouth every 6 (six) hours as needed for Pain. 30 tablet 0    ARIPiprazole (ABILIFY) 2 MG Oral Tab Take 1 tablet (2 mg total) by mouth daily.      rosuvastatin 10 MG Oral Tab Take 1 tablet (10 mg total) by mouth nightly. (Patient not taking: Reported on 2025)

## 2025-06-17 NOTE — PROGRESS NOTES
The following individual(s) verbally consented to be recorded using ambient AI listening technology and understand that they can each withdraw their consent to this listening technology at any point by asking the clinician to turn off or pause the recording:    Patient name: Juana Adler  Additional names:  NA

## 2025-06-19 ENCOUNTER — LAB ENCOUNTER (OUTPATIENT)
Dept: LAB | Facility: HOSPITAL | Age: 65
End: 2025-06-19
Attending: FAMILY MEDICINE
Payer: COMMERCIAL

## 2025-06-19 ENCOUNTER — PATIENT MESSAGE (OUTPATIENT)
Dept: FAMILY MEDICINE CLINIC | Facility: CLINIC | Age: 65
End: 2025-06-19

## 2025-06-19 DIAGNOSIS — Z13.29 SCREENING FOR ENDOCRINE, NUTRITIONAL, METABOLIC AND IMMUNITY DISORDER: ICD-10-CM

## 2025-06-19 DIAGNOSIS — R73.03 PREDIABETES: ICD-10-CM

## 2025-06-19 DIAGNOSIS — Z13.0 SCREENING FOR ENDOCRINE, NUTRITIONAL, METABOLIC AND IMMUNITY DISORDER: ICD-10-CM

## 2025-06-19 DIAGNOSIS — Z01.84 IMMUNITY STATUS TESTING: ICD-10-CM

## 2025-06-19 DIAGNOSIS — Z00.00 ANNUAL PHYSICAL EXAM: ICD-10-CM

## 2025-06-19 DIAGNOSIS — Z13.21 SCREENING FOR ENDOCRINE, NUTRITIONAL, METABOLIC AND IMMUNITY DISORDER: ICD-10-CM

## 2025-06-19 DIAGNOSIS — Z13.228 SCREENING FOR ENDOCRINE, NUTRITIONAL, METABOLIC AND IMMUNITY DISORDER: ICD-10-CM

## 2025-06-19 DIAGNOSIS — E78.2 HYPERLIPIDEMIA, MIXED: ICD-10-CM

## 2025-06-19 LAB
ALBUMIN SERPL-MCNC: 4.9 G/DL (ref 3.2–4.8)
ALBUMIN/GLOB SERPL: 2.1 {RATIO} (ref 1–2)
ALP LIVER SERPL-CCNC: 102 U/L (ref 50–130)
ALT SERPL-CCNC: 27 U/L (ref 10–49)
ANION GAP SERPL CALC-SCNC: 12 MMOL/L (ref 0–18)
AST SERPL-CCNC: 26 U/L (ref ?–34)
BASOPHILS # BLD AUTO: 0.03 X10(3) UL (ref 0–0.2)
BASOPHILS NFR BLD AUTO: 0.6 %
BILIRUB SERPL-MCNC: 0.7 MG/DL (ref 0.2–1.1)
BUN BLD-MCNC: 8 MG/DL (ref 9–23)
CALCIUM BLD-MCNC: 9.7 MG/DL (ref 8.7–10.6)
CHLORIDE SERPL-SCNC: 105 MMOL/L (ref 98–112)
CHOLEST SERPL-MCNC: 193 MG/DL (ref ?–200)
CO2 SERPL-SCNC: 26 MMOL/L (ref 21–32)
CREAT BLD-MCNC: 0.78 MG/DL (ref 0.55–1.02)
EGFRCR SERPLBLD CKD-EPI 2021: 85 ML/MIN/1.73M2 (ref 60–?)
EOSINOPHIL # BLD AUTO: 0.12 X10(3) UL (ref 0–0.7)
EOSINOPHIL NFR BLD AUTO: 2.3 %
ERYTHROCYTE [DISTWIDTH] IN BLOOD BY AUTOMATED COUNT: 13.1 %
EST. AVERAGE GLUCOSE BLD GHB EST-MCNC: 137 MG/DL (ref 68–126)
FASTING PATIENT LIPID ANSWER: YES
FASTING STATUS PATIENT QL REPORTED: YES
GLOBULIN PLAS-MCNC: 2.3 G/DL (ref 2–3.5)
GLUCOSE BLD-MCNC: 119 MG/DL (ref 70–99)
HBA1C MFR BLD: 6.4 % (ref ?–5.7)
HCT VFR BLD AUTO: 37.1 % (ref 35–48)
HDLC SERPL-MCNC: 47 MG/DL (ref 40–59)
HGB BLD-MCNC: 12.8 G/DL (ref 12–16)
IMM GRANULOCYTES # BLD AUTO: 0.01 X10(3) UL (ref 0–1)
IMM GRANULOCYTES NFR BLD: 0.2 %
LDLC SERPL CALC-MCNC: 127 MG/DL (ref ?–100)
LYMPHOCYTES # BLD AUTO: 1.31 X10(3) UL (ref 1–4)
LYMPHOCYTES NFR BLD AUTO: 25.6 %
MCH RBC QN AUTO: 29 PG (ref 26–34)
MCHC RBC AUTO-ENTMCNC: 34.5 G/DL (ref 31–37)
MCV RBC AUTO: 84.1 FL (ref 80–100)
MONOCYTES # BLD AUTO: 0.33 X10(3) UL (ref 0.1–1)
MONOCYTES NFR BLD AUTO: 6.5 %
NEUTROPHILS # BLD AUTO: 3.31 X10 (3) UL (ref 1.5–7.7)
NEUTROPHILS # BLD AUTO: 3.31 X10(3) UL (ref 1.5–7.7)
NEUTROPHILS NFR BLD AUTO: 64.8 %
NONHDLC SERPL-MCNC: 146 MG/DL (ref ?–130)
OSMOLALITY SERPL CALC.SUM OF ELEC: 295 MOSM/KG (ref 275–295)
PLATELET # BLD AUTO: 381 10(3)UL (ref 150–450)
POTASSIUM SERPL-SCNC: 3.8 MMOL/L (ref 3.5–5.1)
PROT SERPL-MCNC: 7.2 G/DL (ref 5.7–8.2)
RBC # BLD AUTO: 4.41 X10(6)UL (ref 3.8–5.3)
RUBV IGG SER QL: POSITIVE
RUBV IGG SER-ACNC: >500 IU/ML (ref 10–?)
SODIUM SERPL-SCNC: 143 MMOL/L (ref 136–145)
TRIGL SERPL-MCNC: 103 MG/DL (ref 30–149)
TSI SER-ACNC: 2.16 UIU/ML (ref 0.55–4.78)
VLDLC SERPL CALC-MCNC: 18 MG/DL (ref 0–30)
WBC # BLD AUTO: 5.1 X10(3) UL (ref 4–11)

## 2025-06-19 PROCEDURE — 86765 RUBEOLA ANTIBODY: CPT

## 2025-06-19 PROCEDURE — 80061 LIPID PANEL: CPT

## 2025-06-19 PROCEDURE — 84443 ASSAY THYROID STIM HORMONE: CPT

## 2025-06-19 PROCEDURE — 83036 HEMOGLOBIN GLYCOSYLATED A1C: CPT

## 2025-06-19 PROCEDURE — 86735 MUMPS ANTIBODY: CPT

## 2025-06-19 PROCEDURE — 86762 RUBELLA ANTIBODY: CPT

## 2025-06-19 PROCEDURE — 36415 COLL VENOUS BLD VENIPUNCTURE: CPT

## 2025-06-19 PROCEDURE — 80053 COMPREHEN METABOLIC PANEL: CPT

## 2025-06-19 PROCEDURE — 85025 COMPLETE CBC W/AUTO DIFF WBC: CPT

## 2025-06-20 LAB
MEV IGG SER-ACNC: >300 AU/ML (ref 16.5–?)
MUV IGG SER IA-ACNC: >300 AU/ML (ref 11–?)

## 2025-06-23 ENCOUNTER — TELEPHONE (OUTPATIENT)
Dept: FAMILY MEDICINE CLINIC | Facility: CLINIC | Age: 65
End: 2025-06-23

## 2025-06-23 NOTE — TELEPHONE ENCOUNTER
Discussed results with pt. and Dr. Terry's recommendations.  Pt. verbalized understanding.     Pt. asked for MMR results to be mailed to her house. Pt. was asking for her immunization records for MMR. Explained to pt. that unfortunately, the titers do not show when the immunizations were given, but the point of the titers are to show that pt. has had exposure in the past or has received the vaccination. Pt. verbalized understanding.    MMR results faxed over to pt.'s house.

## 2025-06-23 NOTE — TELEPHONE ENCOUNTER
Notify pt that A1c was elevated 6.4.  this is prediabetic and close to being diabetic.  Needs to minimize carbohydrates in diet to prevent diabetes.  Please notify that labs show LDL or bad cholesterol mildly elevated.  Recommend decreasing saturated fat in diet as well as increasing fish intake.  Recommend taking otc fish oil or omega 3 fatty acid supplement to help lower level.  She is immune to MMR.      The 10-year ASCVD risk score (Jason KEITA, et al., 2019) is: 4%    Values used to calculate the score:      Age: 65 years      Sex: Female      Is Non- : No      Diabetic: No      Tobacco smoker: No      Systolic Blood Pressure: 110 mmHg      Is BP treated: No      HDL Cholesterol: 47 mg/dL      Total Cholesterol: 193 mg/dL

## 2025-06-23 NOTE — TELEPHONE ENCOUNTER
Her CT abd/pelvis done in Jan 2025 showed \"Subtle serosal nodularity along the liver surface raising possibility for mild liver cirrhosis although appropriate clinical evaluation recommended.\"  That is why I had mentioned it to her.

## 2025-07-08 ENCOUNTER — MED REC SCAN ONLY (OUTPATIENT)
Dept: FAMILY MEDICINE CLINIC | Facility: CLINIC | Age: 65
End: 2025-07-08

## 2025-07-15 ENCOUNTER — HOSPITAL ENCOUNTER (OUTPATIENT)
Dept: MAMMOGRAPHY | Facility: HOSPITAL | Age: 65
Discharge: HOME | End: 2025-07-15
Attending: FAMILY MEDICINE
Payer: COMMERCIAL

## 2025-07-15 DIAGNOSIS — Z12.31 SCREENING MAMMOGRAM FOR BREAST CANCER: ICD-10-CM

## 2025-07-15 DIAGNOSIS — Z00.00 ANNUAL PHYSICAL EXAM: ICD-10-CM

## 2025-07-15 PROCEDURE — 77063 BREAST TOMOSYNTHESIS BI: CPT | Performed by: FAMILY MEDICINE

## 2025-07-15 PROCEDURE — 77067 SCR MAMMO BI INCL CAD: CPT | Performed by: FAMILY MEDICINE

## 2025-07-29 ENCOUNTER — HOSPITAL ENCOUNTER (OUTPATIENT)
Dept: ULTRASOUND IMAGING | Facility: HOSPITAL | Age: 65
Discharge: HOME | End: 2025-07-29
Attending: FAMILY MEDICINE

## 2025-07-29 DIAGNOSIS — K74.69 OTHER CIRRHOSIS OF LIVER (HCC): ICD-10-CM

## 2025-07-29 PROCEDURE — 76981 USE PARENCHYMA: CPT | Performed by: FAMILY MEDICINE

## 2025-07-29 PROCEDURE — 76705 ECHO EXAM OF ABDOMEN: CPT | Performed by: FAMILY MEDICINE

## 2025-08-01 ENCOUNTER — TELEPHONE (OUTPATIENT)
Dept: FAMILY MEDICINE CLINIC | Facility: CLINIC | Age: 65
End: 2025-08-01

## 2025-08-01 ENCOUNTER — TELEPHONE (OUTPATIENT)
Facility: LOCATION | Age: 65
End: 2025-08-01

## 2025-08-01 DIAGNOSIS — R16.0 HEPATOMEGALY: Primary | ICD-10-CM

## 2025-08-01 DIAGNOSIS — K74.00 FIBROSIS OF LIVER: ICD-10-CM

## 2025-08-01 DIAGNOSIS — M25.511 RIGHT SHOULDER PAIN, UNSPECIFIED CHRONICITY: Primary | ICD-10-CM

## 2025-08-01 DIAGNOSIS — K80.20 CALCULUS OF GALLBLADDER WITHOUT CHOLECYSTITIS WITHOUT OBSTRUCTION: ICD-10-CM

## 2025-08-04 ENCOUNTER — OFFICE VISIT (OUTPATIENT)
Dept: ORTHOPEDICS CLINIC | Facility: CLINIC | Age: 65
End: 2025-08-04

## 2025-08-04 ENCOUNTER — HOSPITAL ENCOUNTER (OUTPATIENT)
Dept: GENERAL RADIOLOGY | Age: 65
Discharge: HOME | End: 2025-08-04
Attending: FAMILY MEDICINE

## 2025-08-04 DIAGNOSIS — M19.011 OSTEOARTHRITIS OF GLENOHUMERAL JOINT, RIGHT: ICD-10-CM

## 2025-08-04 DIAGNOSIS — M25.511 RIGHT SHOULDER PAIN, UNSPECIFIED CHRONICITY: ICD-10-CM

## 2025-08-04 DIAGNOSIS — M75.101 TEAR OF RIGHT ROTATOR CUFF, UNSPECIFIED TEAR EXTENT, UNSPECIFIED WHETHER TRAUMATIC: ICD-10-CM

## 2025-08-04 DIAGNOSIS — M25.511 RIGHT SHOULDER PAIN, UNSPECIFIED CHRONICITY: Primary | ICD-10-CM

## 2025-08-04 PROBLEM — R16.0 HEPATOMEGALY: Status: ACTIVE | Noted: 2025-08-04

## 2025-08-04 PROCEDURE — 73030 X-RAY EXAM OF SHOULDER: CPT | Performed by: FAMILY MEDICINE

## 2025-08-04 PROCEDURE — 20611 DRAIN/INJ JOINT/BURSA W/US: CPT | Performed by: FAMILY MEDICINE

## 2025-08-04 PROCEDURE — 99203 OFFICE O/P NEW LOW 30 MIN: CPT | Performed by: FAMILY MEDICINE

## 2025-08-04 RX ORDER — TRIAMCINOLONE ACETONIDE 40 MG/ML
40 INJECTION, SUSPENSION INTRA-ARTICULAR; INTRAMUSCULAR ONCE
Status: COMPLETED | OUTPATIENT
Start: 2025-08-04 | End: 2025-08-04

## 2025-08-04 RX ADMIN — TRIAMCINOLONE ACETONIDE 40 MG: 40 INJECTION, SUSPENSION INTRA-ARTICULAR; INTRAMUSCULAR at 12:46:00

## 2025-08-05 ENCOUNTER — TELEPHONE (OUTPATIENT)
Dept: CASE MANAGEMENT | Age: 65
End: 2025-08-05

## 2025-08-05 DIAGNOSIS — R16.0 HEPATOMEGALY: Primary | ICD-10-CM

## 2025-08-05 DIAGNOSIS — K74.00 FIBROSIS OF LIVER: ICD-10-CM

## 2025-08-12 ENCOUNTER — MED REC SCAN ONLY (OUTPATIENT)
Dept: FAMILY MEDICINE CLINIC | Facility: CLINIC | Age: 65
End: 2025-08-12

## (undated) NOTE — LETTER
11/4/2024          To Whom It May Concern:    Juana Adler is currently under my medical care, patient had an appointment today with TEVIN Thompson.     She may return to work on 11/04/2024.  Activity is restricted as follows: No pushing, pulling under 50 pounds until patient see pain management.     If you require additional information please contact our office.        Sincerely,    ADITI Jacobo

## (undated) NOTE — LETTER
11/4/2024          To Whom It May Concern:    Juana Adler is currently under my medical care, patient had an appointment today with TEVIN Thompson.     She may return to work on 11/04/2024.      If you require additional information please contact our office.        Sincerely,    ADITI Jacobo          Document generated by:  Jillian LUNA MA

## (undated) NOTE — LETTER
1/10/2025              Juana RUST Vitor        [address] [address]         To Whom It May Concern     Please allow Juana to wear the neck brace at work for next two weeks.   Please feel free to reach our office any further questions.     Thank you        Sincerely,    Connie Joe MD          Document electronically generated by:  Connie Joe MD